# Patient Record
Sex: MALE | Race: WHITE | NOT HISPANIC OR LATINO | Employment: UNEMPLOYED | URBAN - METROPOLITAN AREA
[De-identification: names, ages, dates, MRNs, and addresses within clinical notes are randomized per-mention and may not be internally consistent; named-entity substitution may affect disease eponyms.]

---

## 2023-08-16 ENCOUNTER — TRANSFERRED RECORDS (OUTPATIENT)
Dept: HEALTH INFORMATION MANAGEMENT | Facility: CLINIC | Age: 58
End: 2023-08-16
Payer: MEDICAID

## 2023-08-16 ENCOUNTER — HOSPITAL ENCOUNTER (INPATIENT)
Facility: CLINIC | Age: 58
LOS: 3 days | Discharge: HOME HEALTH | End: 2023-08-19
Attending: STUDENT IN AN ORGANIZED HEALTH CARE EDUCATION/TRAINING PROGRAM | Admitting: STUDENT IN AN ORGANIZED HEALTH CARE EDUCATION/TRAINING PROGRAM
Payer: MEDICAID

## 2023-08-16 DIAGNOSIS — K21.00 GASTROESOPHAGEAL REFLUX DISEASE WITH ESOPHAGITIS, UNSPECIFIED WHETHER HEMORRHAGE: ICD-10-CM

## 2023-08-16 DIAGNOSIS — M25.50 POLYARTHRALGIA: Primary | ICD-10-CM

## 2023-08-16 LAB
ALT SERPL-CCNC: 8 U/L (ref 7–52)
AST SERPL-CCNC: 9 U/L (ref 13–39)
BASOPHILS # BLD AUTO: 0 10E3/UL (ref 0–0.2)
BASOPHILS NFR BLD AUTO: 0 %
CREATININE (EXTERNAL): 0.3 MG/DL (ref 0.7–1.3)
EOSINOPHIL # BLD AUTO: 0 10E3/UL (ref 0–0.7)
EOSINOPHIL NFR BLD AUTO: 0 %
ERYTHROCYTE [DISTWIDTH] IN BLOOD BY AUTOMATED COUNT: 13.2 % (ref 10–15)
GFR ESTIMATED (EXTERNAL): 308 (ref 60–90)
GLUCOSE (EXTERNAL): 155 MG/DL (ref 74–109)
HCT VFR BLD AUTO: 33 % (ref 40–53)
HGB BLD-MCNC: 11.5 G/DL (ref 13.3–17.7)
IMM GRANULOCYTES # BLD: 0.3 10E3/UL
IMM GRANULOCYTES NFR BLD: 1 %
LYMPHOCYTES # BLD AUTO: 1.8 10E3/UL (ref 0.8–5.3)
LYMPHOCYTES NFR BLD AUTO: 6 %
MCH RBC QN AUTO: 33.9 PG (ref 26.5–33)
MCHC RBC AUTO-ENTMCNC: 34.8 G/DL (ref 31.5–36.5)
MCV RBC AUTO: 97 FL (ref 78–100)
MONOCYTES # BLD AUTO: 1.8 10E3/UL (ref 0–1.3)
MONOCYTES NFR BLD AUTO: 6 %
NEUTROPHILS # BLD AUTO: 24.7 10E3/UL (ref 1.6–8.3)
NEUTROPHILS NFR BLD AUTO: 87 %
NRBC # BLD AUTO: 0 10E3/UL
NRBC BLD AUTO-RTO: 0 /100
PLATELET # BLD AUTO: 451 10E3/UL (ref 150–450)
POTASSIUM (EXTERNAL): 4.1 MMOL/L (ref 3.5–5.1)
RBC # BLD AUTO: 3.39 10E6/UL (ref 4.4–5.9)
WBC # BLD AUTO: 28.6 10E3/UL (ref 4–11)

## 2023-08-16 PROCEDURE — 87040 BLOOD CULTURE FOR BACTERIA: CPT | Performed by: STUDENT IN AN ORGANIZED HEALTH CARE EDUCATION/TRAINING PROGRAM

## 2023-08-16 PROCEDURE — 120N000002 HC R&B MED SURG/OB UMMC

## 2023-08-16 PROCEDURE — 99223 1ST HOSP IP/OBS HIGH 75: CPT | Performed by: STUDENT IN AN ORGANIZED HEALTH CARE EDUCATION/TRAINING PROGRAM

## 2023-08-16 PROCEDURE — 86704 HEP B CORE ANTIBODY TOTAL: CPT | Performed by: STUDENT IN AN ORGANIZED HEALTH CARE EDUCATION/TRAINING PROGRAM

## 2023-08-16 PROCEDURE — 85652 RBC SED RATE AUTOMATED: CPT | Performed by: STUDENT IN AN ORGANIZED HEALTH CARE EDUCATION/TRAINING PROGRAM

## 2023-08-16 PROCEDURE — 86140 C-REACTIVE PROTEIN: CPT | Performed by: STUDENT IN AN ORGANIZED HEALTH CARE EDUCATION/TRAINING PROGRAM

## 2023-08-16 PROCEDURE — 36415 COLL VENOUS BLD VENIPUNCTURE: CPT | Performed by: STUDENT IN AN ORGANIZED HEALTH CARE EDUCATION/TRAINING PROGRAM

## 2023-08-16 PROCEDURE — 93005 ELECTROCARDIOGRAM TRACING: CPT

## 2023-08-16 PROCEDURE — 85025 COMPLETE CBC W/AUTO DIFF WBC: CPT | Performed by: STUDENT IN AN ORGANIZED HEALTH CARE EDUCATION/TRAINING PROGRAM

## 2023-08-16 PROCEDURE — 80053 COMPREHEN METABOLIC PANEL: CPT | Performed by: STUDENT IN AN ORGANIZED HEALTH CARE EDUCATION/TRAINING PROGRAM

## 2023-08-16 PROCEDURE — 86803 HEPATITIS C AB TEST: CPT | Performed by: STUDENT IN AN ORGANIZED HEALTH CARE EDUCATION/TRAINING PROGRAM

## 2023-08-16 PROCEDURE — 93010 ELECTROCARDIOGRAM REPORT: CPT | Performed by: INTERNAL MEDICINE

## 2023-08-16 RX ORDER — OXYCODONE HYDROCHLORIDE 5 MG/1
5 TABLET ORAL EVERY 4 HOURS PRN
Status: DISCONTINUED | OUTPATIENT
Start: 2023-08-16 | End: 2023-08-19 | Stop reason: HOSPADM

## 2023-08-16 RX ORDER — ACETAMINOPHEN 325 MG/1
650 TABLET ORAL EVERY 4 HOURS PRN
Status: DISCONTINUED | OUTPATIENT
Start: 2023-08-16 | End: 2023-08-19 | Stop reason: HOSPADM

## 2023-08-16 RX ORDER — ONDANSETRON 4 MG/1
4 TABLET, ORALLY DISINTEGRATING ORAL EVERY 6 HOURS PRN
Status: DISCONTINUED | OUTPATIENT
Start: 2023-08-16 | End: 2023-08-19 | Stop reason: HOSPADM

## 2023-08-16 RX ORDER — SODIUM CHLORIDE 9 MG/ML
INJECTION, SOLUTION INTRAVENOUS CONTINUOUS
Status: ACTIVE | OUTPATIENT
Start: 2023-08-17 | End: 2023-08-17

## 2023-08-16 RX ORDER — POLYETHYLENE GLYCOL 3350 17 G/17G
17 POWDER, FOR SOLUTION ORAL DAILY
Status: DISCONTINUED | OUTPATIENT
Start: 2023-08-17 | End: 2023-08-19 | Stop reason: HOSPADM

## 2023-08-16 RX ORDER — LIDOCAINE 40 MG/G
CREAM TOPICAL
Status: DISCONTINUED | OUTPATIENT
Start: 2023-08-16 | End: 2023-08-19 | Stop reason: HOSPADM

## 2023-08-16 RX ORDER — HYDROMORPHONE HYDROCHLORIDE 1 MG/ML
0.3 INJECTION, SOLUTION INTRAMUSCULAR; INTRAVENOUS; SUBCUTANEOUS
Status: DISCONTINUED | OUTPATIENT
Start: 2023-08-16 | End: 2023-08-19 | Stop reason: HOSPADM

## 2023-08-16 RX ORDER — NALOXONE HYDROCHLORIDE 0.4 MG/ML
0.4 INJECTION, SOLUTION INTRAMUSCULAR; INTRAVENOUS; SUBCUTANEOUS
Status: DISCONTINUED | OUTPATIENT
Start: 2023-08-16 | End: 2023-08-19 | Stop reason: HOSPADM

## 2023-08-16 RX ORDER — NALOXONE HYDROCHLORIDE 0.4 MG/ML
0.2 INJECTION, SOLUTION INTRAMUSCULAR; INTRAVENOUS; SUBCUTANEOUS
Status: DISCONTINUED | OUTPATIENT
Start: 2023-08-16 | End: 2023-08-19 | Stop reason: HOSPADM

## 2023-08-16 RX ORDER — ONDANSETRON 2 MG/ML
4 INJECTION INTRAMUSCULAR; INTRAVENOUS EVERY 6 HOURS PRN
Status: DISCONTINUED | OUTPATIENT
Start: 2023-08-16 | End: 2023-08-19 | Stop reason: HOSPADM

## 2023-08-16 RX ORDER — PIPERACILLIN SODIUM, TAZOBACTAM SODIUM 3; .375 G/15ML; G/15ML
3.38 INJECTION, POWDER, LYOPHILIZED, FOR SOLUTION INTRAVENOUS EVERY 6 HOURS
Status: DISCONTINUED | OUTPATIENT
Start: 2023-08-17 | End: 2023-08-18

## 2023-08-16 ASSESSMENT — COLUMBIA-SUICIDE SEVERITY RATING SCALE - C-SSRS
1. IN THE PAST MONTH, HAVE YOU WISHED YOU WERE DEAD OR WISHED YOU COULD GO TO SLEEP AND NOT WAKE UP?: NO
6. HAVE YOU EVER DONE ANYTHING, STARTED TO DO ANYTHING, OR PREPARED TO DO ANYTHING TO END YOUR LIFE?: NO
3. HAVE YOU BEEN THINKING ABOUT HOW YOU MIGHT KILL YOURSELF?: NO
5. HAVE YOU STARTED TO WORK OUT OR WORKED OUT THE DETAILS OF HOW TO KILL YOURSELF? DO YOU INTEND TO CARRY OUT THIS PLAN?: NO
2. HAVE YOU ACTUALLY HAD ANY THOUGHTS OF KILLING YOURSELF IN THE PAST MONTH?: NO
4. HAVE YOU HAD THESE THOUGHTS AND HAD SOME INTENTION OF ACTING ON THEM?: NO

## 2023-08-16 ASSESSMENT — ACTIVITIES OF DAILY LIVING (ADL): ADLS_ACUITY_SCORE: 33

## 2023-08-16 NOTE — PROGRESS NOTES
Transfer Type: [facility]  Transfer Triage Note    Date of call: 08/16/23  Time of call: 1:46 PM    Current Patient Location:  [place]  Current Level of Care: ED    Vitals:                      at    Diagnosis: Sepsis and swollen joints  Is COVID-19 a concern? No  Reason for requested transfer: Further diagnostic work up, management, and consultation for specialized care   Isolation Needs: None    Outside Records: Not available  Additional records may be faxed to [phone].    Transfer accepted: Yes  Stability of Patient: Patient is vitally stable, with no critical labs, and will likely remain stable throughout the transfer process  Level of Care Needed: Med Surg  Telemetry Needed:  Med (Remote) Telemetry  Expected Time of Arrival for Transfer: 0-8 hours  Arrival Location:  [facility]    Recommendations for Management and Stabilization: Given    Additional Comments: 58 year old man with leg pain. Seen at [facility] and treated for cellulitis (per NP was not thrombus or septic joint) about 1 week ago. Returns with R leg pain (knee). WBC with increase to 28k from 20k. Slight monocyte elevation. No fever. Negative lyme. Lactate at 2.4 after fluids. CRP and ESR elevated. Would like to transfer for further workup and concern for sepsis vs rheum etiology. Consideration for rheumatology consult on admission. Will put on wait list for  bed.    Nick Romero MD

## 2023-08-16 NOTE — LETTER
Recipient:  [facility]  Fax: [phone]        Sender:  ENDER Owen Care Coordinator  Ph: [phone]        Date: August 19, 2023  Patient Name:  Elijah Beach  Patient YOB: 1965  Routing Message:    I reached out today (8/19/23) to follow-up on a home care referral that was already made for this patient, but was informed there is nobody available to discuss the referral over the weekend.  I was given this fax number to send information to.  Attached are signed physician orders, in the event you are able to accept this patient for home PT services.  Thank you for your consideration.      The documents accompanying this notice contain confidential information belonging to the sender.  This information is intended only for the use of the individual or entity named above.  The authorized recipient of this information is prohibited from disclosing this information to any other party and is required to destroy the information after its stated need has been fulfilled, unless otherwise required by state law.    If you are not the intended recipient, you are hereby notified that any disclosure, copy, distribution or action taken in reliance on the contents of these documents is strictly prohibited.  If you have received this document in error, please return it by fax to [phone] with a note on the cover sheet explaining why you are returning it (e.g. not your patient, not your provider, etc.).  If you need further assistance, please call .  Documents may also be returned by mail to Health Information Management, , [facility] [address], [place], [place] [address].

## 2023-08-16 NOTE — LETTER
Transition Communication Hand-off for Care Transitions to Next Level of Care Provider    Name: Elijah Beach  : 1965  MRN #: [id number]  Primary Care Provider: Benedict Márquez     Primary Clinic: [facility]  [address]    [place] [address]     Reason for Hospitalization:  Polyarthralgia [M25.50]  Admit Date/Time: 2023  9:48 PM  Discharge Date: 23  Payor Source: Payor: MEDICAID [place] / Plan: [employer] [place] / Product Type: *No Product type* /          Reason for Communication Hand-off Referral: Avoidable readmission within 30 days    Discharge Plan: Discharged to home w/ outpatient PT referral, home PT recommended (see bottom of this handoff for further details).    Discharge Plan:      Flowsheet Row Most Recent Value   Concerns Comments ability to find home PT in his area, lives 3 miles out of town             Discharge Needs Assessment:  Needs      Flowsheet Row Most Recent Value   Equipment Currently Used at Home walker, rolling          Follow-up plan:  No future appointments.    Any outstanding tests or procedures:        Referrals       Future Labs/Procedures    Physical Therapy Referral     Process Instructions:    Work Related Injury: Functional Capacity and Work Conditioning are only offered at [facility] and [facility] (service can be provided by PT or OT).    *This therapy referral will be filtered to a centralized scheduling office at [facility] and the patient will receive a call to schedule an appointment at a [facility] location most convenient for them. *    Comments:    Please be aware that coverage of these services is subject to the terms and limitations of your health insurance plan.  Call member services at your health plan with any benefit or coverage questions.  Please call to schedule your appointment      Home Care Referral     Comments:    Your provider has ordered home health services. If you have not been contacted within 2 days  of your discharge please call the selected Home Care agency listed on your Discharge document.  If a Home Care agency is NOT listed, please call [phone].              Key Recommendations:  Patient was recommended home care PT, however agencies that service his area are limited and the remaining pending home care referral was unable to be followed up on due to no staffing over the weekend at home care agency.  Sent orders to home care agency in the event they are able to accept patient for services, as well as placed outpatient PT referral.  Recommend PCP follow-up appt for this patient and follow-up with pending home care agency ([facility]: [phone] ext [phone]).    Daniel Mancia RN Care Coordinator    AVS/Discharge Summary is the source of truth; this is a helpful guide for improved communication of patient story

## 2023-08-17 ENCOUNTER — APPOINTMENT (OUTPATIENT)
Dept: GENERAL RADIOLOGY | Facility: CLINIC | Age: 58
End: 2023-08-17
Payer: MEDICAID

## 2023-08-17 ENCOUNTER — APPOINTMENT (OUTPATIENT)
Dept: GENERAL RADIOLOGY | Facility: CLINIC | Age: 58
End: 2023-08-17
Attending: INTERNAL MEDICINE
Payer: MEDICAID

## 2023-08-17 ENCOUNTER — APPOINTMENT (OUTPATIENT)
Dept: PHYSICAL THERAPY | Facility: CLINIC | Age: 58
End: 2023-08-17
Attending: STUDENT IN AN ORGANIZED HEALTH CARE EDUCATION/TRAINING PROGRAM
Payer: MEDICAID

## 2023-08-17 LAB
ALBUMIN SERPL BCG-MCNC: 2.9 G/DL (ref 3.5–5.2)
ALBUMIN SERPL BCG-MCNC: 3.3 G/DL (ref 3.5–5.2)
ALP SERPL-CCNC: 60 U/L (ref 40–129)
ALP SERPL-CCNC: 67 U/L (ref 40–129)
ALT SERPL W P-5'-P-CCNC: 9 U/L (ref 0–70)
ALT SERPL W P-5'-P-CCNC: 9 U/L (ref 0–70)
ANION GAP SERPL CALCULATED.3IONS-SCNC: 10 MMOL/L (ref 7–15)
ANION GAP SERPL CALCULATED.3IONS-SCNC: 10 MMOL/L (ref 7–15)
APPEARANCE FLD: ABNORMAL
AST SERPL W P-5'-P-CCNC: 12 U/L (ref 0–45)
AST SERPL W P-5'-P-CCNC: 14 U/L (ref 0–45)
BILIRUB SERPL-MCNC: 0.3 MG/DL
BILIRUB SERPL-MCNC: 0.3 MG/DL
BUN SERPL-MCNC: 10.6 MG/DL (ref 6–20)
BUN SERPL-MCNC: 7.2 MG/DL (ref 6–20)
CALCIUM SERPL-MCNC: 9.3 MG/DL (ref 8.6–10)
CALCIUM SERPL-MCNC: 9.4 MG/DL (ref 8.6–10)
CELL COUNT BODY FLUID SOURCE: ABNORMAL
CHLORIDE SERPL-SCNC: 101 MMOL/L (ref 98–107)
CHLORIDE SERPL-SCNC: 95 MMOL/L (ref 98–107)
COLOR FLD: ABNORMAL
CREAT SERPL-MCNC: 0.42 MG/DL (ref 0.67–1.17)
CREAT SERPL-MCNC: 0.45 MG/DL (ref 0.67–1.17)
CRP SERPL-MCNC: 129.35 MG/L
CRP SERPL-MCNC: 90.76 MG/L
CRYSTALS SNV MICRO: NORMAL
DEPRECATED HCO3 PLAS-SCNC: 24 MMOL/L (ref 22–29)
DEPRECATED HCO3 PLAS-SCNC: 26 MMOL/L (ref 22–29)
ERYTHROCYTE [DISTWIDTH] IN BLOOD BY AUTOMATED COUNT: 13.2 % (ref 10–15)
ERYTHROCYTE [DISTWIDTH] IN BLOOD BY AUTOMATED COUNT: 13.3 % (ref 10–15)
ERYTHROCYTE [SEDIMENTATION RATE] IN BLOOD BY WESTERGREN METHOD: 38 MM/HR (ref 0–20)
ERYTHROCYTE [SEDIMENTATION RATE] IN BLOOD BY WESTERGREN METHOD: 47 MM/HR (ref 0–20)
GFR SERPL CREATININE-BSD FRML MDRD: >90 ML/MIN/1.73M2
GFR SERPL CREATININE-BSD FRML MDRD: >90 ML/MIN/1.73M2
GLUCOSE SERPL-MCNC: 126 MG/DL (ref 70–99)
GLUCOSE SERPL-MCNC: 135 MG/DL (ref 70–99)
GRAM STAIN RESULT: NORMAL
GRAM STAIN RESULT: NORMAL
HBV CORE AB SERPL QL IA: NONREACTIVE
HBV SURFACE AB SERPL IA-ACNC: 0.16 M[IU]/ML
HBV SURFACE AB SERPL IA-ACNC: NONREACTIVE M[IU]/ML
HCT VFR BLD AUTO: 30.5 % (ref 40–53)
HCT VFR BLD AUTO: 31 % (ref 40–53)
HCV AB SERPL QL IA: NONREACTIVE
HGB BLD-MCNC: 10.6 G/DL (ref 13.3–17.7)
HGB BLD-MCNC: 10.8 G/DL (ref 13.3–17.7)
HOLD SPECIMEN: NORMAL
LYMPHOCYTES NFR FLD MANUAL: NORMAL %
MCH RBC QN AUTO: 33.5 PG (ref 26.5–33)
MCH RBC QN AUTO: 34.3 PG (ref 26.5–33)
MCHC RBC AUTO-ENTMCNC: 34.2 G/DL (ref 31.5–36.5)
MCHC RBC AUTO-ENTMCNC: 35.4 G/DL (ref 31.5–36.5)
MCV RBC AUTO: 97 FL (ref 78–100)
MCV RBC AUTO: 98 FL (ref 78–100)
MONOS+MACROS NFR FLD MANUAL: 3 %
NEUTS BAND NFR FLD MANUAL: 97 %
PLATELET # BLD AUTO: 423 10E3/UL (ref 150–450)
PLATELET # BLD AUTO: 446 10E3/UL (ref 150–450)
POTASSIUM SERPL-SCNC: 3.8 MMOL/L (ref 3.4–5.3)
POTASSIUM SERPL-SCNC: 4.3 MMOL/L (ref 3.4–5.3)
PROT SERPL-MCNC: 5.4 G/DL (ref 6.4–8.3)
PROT SERPL-MCNC: 6 G/DL (ref 6.4–8.3)
RBC # BLD AUTO: 3.15 10E6/UL (ref 4.4–5.9)
RBC # BLD AUTO: 3.16 10E6/UL (ref 4.4–5.9)
SODIUM SERPL-SCNC: 129 MMOL/L (ref 136–145)
SODIUM SERPL-SCNC: 137 MMOL/L (ref 136–145)
URATE SERPL-MCNC: 2.3 MG/DL (ref 3.4–7)
WBC # BLD AUTO: 23.1 10E3/UL (ref 4–11)
WBC # BLD AUTO: 24.7 10E3/UL (ref 4–11)
WBC # FLD AUTO: ABNORMAL /UL

## 2023-08-17 PROCEDURE — 89060 EXAM SYNOVIAL FLUID CRYSTALS: CPT

## 2023-08-17 PROCEDURE — 89050 BODY FLUID CELL COUNT: CPT

## 2023-08-17 PROCEDURE — 87205 SMEAR GRAM STAIN: CPT

## 2023-08-17 PROCEDURE — 97116 GAIT TRAINING THERAPY: CPT | Mod: GP

## 2023-08-17 PROCEDURE — 120N000002 HC R&B MED SURG/OB UMMC

## 2023-08-17 PROCEDURE — 87102 FUNGUS ISOLATION CULTURE: CPT

## 2023-08-17 PROCEDURE — 97530 THERAPEUTIC ACTIVITIES: CPT | Mod: GP

## 2023-08-17 PROCEDURE — 73522 X-RAY EXAM HIPS BI 3-4 VIEWS: CPT

## 2023-08-17 PROCEDURE — 89051 BODY FLUID CELL COUNT: CPT

## 2023-08-17 PROCEDURE — 85027 COMPLETE CBC AUTOMATED: CPT | Performed by: STUDENT IN AN ORGANIZED HEALTH CARE EDUCATION/TRAINING PROGRAM

## 2023-08-17 PROCEDURE — 250N000013 HC RX MED GY IP 250 OP 250 PS 637: Performed by: INTERNAL MEDICINE

## 2023-08-17 PROCEDURE — 87075 CULTR BACTERIA EXCEPT BLOOD: CPT

## 2023-08-17 PROCEDURE — 85027 COMPLETE CBC AUTOMATED: CPT

## 2023-08-17 PROCEDURE — 87389 HIV-1 AG W/HIV-1&-2 AB AG IA: CPT | Performed by: INTERNAL MEDICINE

## 2023-08-17 PROCEDURE — 84550 ASSAY OF BLOOD/URIC ACID: CPT

## 2023-08-17 PROCEDURE — 86140 C-REACTIVE PROTEIN: CPT

## 2023-08-17 PROCEDURE — 0S9C3ZX DRAINAGE OF RIGHT KNEE JOINT, PERCUTANEOUS APPROACH, DIAGNOSTIC: ICD-10-PCS | Performed by: ORTHOPAEDIC SURGERY

## 2023-08-17 PROCEDURE — 87206 SMEAR FLUORESCENT/ACID STAI: CPT

## 2023-08-17 PROCEDURE — 250N000013 HC RX MED GY IP 250 OP 250 PS 637: Performed by: STUDENT IN AN ORGANIZED HEALTH CARE EDUCATION/TRAINING PROGRAM

## 2023-08-17 PROCEDURE — 85652 RBC SED RATE AUTOMATED: CPT

## 2023-08-17 PROCEDURE — 87491 CHLMYD TRACH DNA AMP PROBE: CPT | Performed by: INTERNAL MEDICINE

## 2023-08-17 PROCEDURE — 258N000003 HC RX IP 258 OP 636: Performed by: STUDENT IN AN ORGANIZED HEALTH CARE EDUCATION/TRAINING PROGRAM

## 2023-08-17 PROCEDURE — 87070 CULTURE OTHR SPECIMN AEROBIC: CPT

## 2023-08-17 PROCEDURE — 99233 SBSQ HOSP IP/OBS HIGH 50: CPT | Performed by: INTERNAL MEDICINE

## 2023-08-17 PROCEDURE — 36415 COLL VENOUS BLD VENIPUNCTURE: CPT | Performed by: INTERNAL MEDICINE

## 2023-08-17 PROCEDURE — 250N000011 HC RX IP 250 OP 636: Mod: JZ | Performed by: STUDENT IN AN ORGANIZED HEALTH CARE EDUCATION/TRAINING PROGRAM

## 2023-08-17 PROCEDURE — 36415 COLL VENOUS BLD VENIPUNCTURE: CPT | Performed by: STUDENT IN AN ORGANIZED HEALTH CARE EDUCATION/TRAINING PROGRAM

## 2023-08-17 PROCEDURE — 73562 X-RAY EXAM OF KNEE 3: CPT | Mod: LT

## 2023-08-17 PROCEDURE — 87476 LYME DIS DNA AMP PROBE: CPT | Performed by: INTERNAL MEDICINE

## 2023-08-17 PROCEDURE — 97161 PT EVAL LOW COMPLEX 20 MIN: CPT | Mod: GP

## 2023-08-17 PROCEDURE — 99255 IP/OBS CONSLTJ NEW/EST HI 80: CPT | Mod: GC | Performed by: INTERNAL MEDICINE

## 2023-08-17 PROCEDURE — 36415 COLL VENOUS BLD VENIPUNCTURE: CPT

## 2023-08-17 PROCEDURE — 80053 COMPREHEN METABOLIC PANEL: CPT | Performed by: STUDENT IN AN ORGANIZED HEALTH CARE EDUCATION/TRAINING PROGRAM

## 2023-08-17 PROCEDURE — 87116 MYCOBACTERIA CULTURE: CPT

## 2023-08-17 RX ORDER — ACETAMINOPHEN 500 MG
1000 TABLET ORAL 3 TIMES DAILY
COMMUNITY

## 2023-08-17 RX ORDER — CITALOPRAM HYDROBROMIDE 10 MG/1
30 TABLET ORAL DAILY
Status: DISCONTINUED | OUTPATIENT
Start: 2023-08-17 | End: 2023-08-17

## 2023-08-17 RX ORDER — TETRAHYDROZOLINE HCL 0.05 %
1 DROPS OPHTHALMIC (EYE) 4 TIMES DAILY PRN
Status: DISCONTINUED | OUTPATIENT
Start: 2023-08-17 | End: 2023-08-19 | Stop reason: HOSPADM

## 2023-08-17 RX ORDER — IBUPROFEN 400 MG/1
400 TABLET, FILM COATED ORAL 3 TIMES DAILY
Status: ON HOLD | COMMUNITY
End: 2023-08-19

## 2023-08-17 RX ORDER — TETRAHYDROZOLINE HCL 0.05 %
1 DROPS OPHTHALMIC (EYE) PRN
COMMUNITY

## 2023-08-17 RX ORDER — OXYCODONE HYDROCHLORIDE 5 MG/1
2.5-5 TABLET ORAL EVERY 6 HOURS PRN
COMMUNITY

## 2023-08-17 RX ORDER — ACETAMINOPHEN 500 MG
1000 TABLET ORAL 3 TIMES DAILY
Status: DISCONTINUED | OUTPATIENT
Start: 2023-08-17 | End: 2023-08-19 | Stop reason: HOSPADM

## 2023-08-17 RX ORDER — CITALOPRAM HYDROBROMIDE 10 MG/1
30 TABLET ORAL DAILY
Status: DISCONTINUED | OUTPATIENT
Start: 2023-08-17 | End: 2023-08-19 | Stop reason: HOSPADM

## 2023-08-17 RX ORDER — CITALOPRAM HYDROBROMIDE 20 MG/1
30 TABLET ORAL DAILY
COMMUNITY

## 2023-08-17 RX ORDER — IBUPROFEN 200 MG
400 TABLET ORAL 3 TIMES DAILY
Status: DISCONTINUED | OUTPATIENT
Start: 2023-08-17 | End: 2023-08-18 | Stop reason: ALTCHOICE

## 2023-08-17 RX ADMIN — OXYCODONE HYDROCHLORIDE 5 MG: 5 TABLET ORAL at 17:07

## 2023-08-17 RX ADMIN — VANCOMYCIN HYDROCHLORIDE 1250 MG: 10 INJECTION, POWDER, LYOPHILIZED, FOR SOLUTION INTRAVENOUS at 02:40

## 2023-08-17 RX ADMIN — POLYETHYLENE GLYCOL 3350 17 G: 17 POWDER, FOR SOLUTION ORAL at 08:23

## 2023-08-17 RX ADMIN — IBUPROFEN 400 MG: 200 TABLET, FILM COATED ORAL at 13:38

## 2023-08-17 RX ADMIN — PIPERACILLIN AND TAZOBACTAM 3.38 G: 3; .375 INJECTION, POWDER, LYOPHILIZED, FOR SOLUTION INTRAVENOUS at 01:38

## 2023-08-17 RX ADMIN — ACETAMINOPHEN 1000 MG: 500 TABLET, FILM COATED ORAL at 19:52

## 2023-08-17 RX ADMIN — PIPERACILLIN AND TAZOBACTAM 3.38 G: 3; .375 INJECTION, POWDER, LYOPHILIZED, FOR SOLUTION INTRAVENOUS at 19:54

## 2023-08-17 RX ADMIN — PIPERACILLIN AND TAZOBACTAM 3.38 G: 3; .375 INJECTION, POWDER, LYOPHILIZED, FOR SOLUTION INTRAVENOUS at 11:50

## 2023-08-17 RX ADMIN — CITALOPRAM HYDROBROMIDE 30 MG: 10 TABLET ORAL at 08:23

## 2023-08-17 RX ADMIN — VANCOMYCIN HYDROCHLORIDE 1250 MG: 10 INJECTION, POWDER, LYOPHILIZED, FOR SOLUTION INTRAVENOUS at 13:27

## 2023-08-17 RX ADMIN — IBUPROFEN 400 MG: 200 TABLET, FILM COATED ORAL at 19:52

## 2023-08-17 RX ADMIN — SODIUM CHLORIDE: 9 INJECTION, SOLUTION INTRAVENOUS at 01:38

## 2023-08-17 RX ADMIN — ACETAMINOPHEN 1000 MG: 500 TABLET, FILM COATED ORAL at 13:38

## 2023-08-17 RX ADMIN — PIPERACILLIN AND TAZOBACTAM 3.38 G: 3; .375 INJECTION, POWDER, LYOPHILIZED, FOR SOLUTION INTRAVENOUS at 06:17

## 2023-08-17 ASSESSMENT — ACTIVITIES OF DAILY LIVING (ADL)
CONCENTRATING,_REMEMBERING_OR_MAKING_DECISIONS_DIFFICULTY: NO
ADLS_ACUITY_SCORE: 28
TOILETING: 1-->ASSISTANCE (EQUIPMENT/PERSON) NEEDED
WEAR_GLASSES_OR_BLIND: YES
HEARING_DIFFICULTY_OR_DEAF: NO
ADLS_ACUITY_SCORE: 28
WALKING_OR_CLIMBING_STAIRS: AMBULATION DIFFICULTY, ASSISTANCE 1 PERSON
ADLS_ACUITY_SCORE: 28
ADLS_ACUITY_SCORE: 28
EQUIPMENT_CURRENTLY_USED_AT_HOME: WALKER, STANDARD
TOILETING: 1-->ASSISTANCE (EQUIPMENT/PERSON) NEEDED (NOT DEVELOPMENTALLY APPROPRIATE)
ADLS_ACUITY_SCORE: 28
TRANSFERRING: 1-->ASSISTANCE (EQUIPMENT/PERSON) NEEDED
ADLS_ACUITY_SCORE: 28
TOILETING_ISSUES: YES
CHANGE_IN_FUNCTIONAL_STATUS_SINCE_ONSET_OF_CURRENT_ILLNESS/INJURY: YES
DIFFICULTY_EATING/SWALLOWING: NO
DOING_ERRANDS_INDEPENDENTLY_DIFFICULTY: NO
ADLS_ACUITY_SCORE: 28
FALL_HISTORY_WITHIN_LAST_SIX_MONTHS: NO
DIFFICULTY_COMMUNICATING: NO
WALKING_OR_CLIMBING_STAIRS_DIFFICULTY: NO
DRESSING/BATHING_DIFFICULTY: NO
ADLS_ACUITY_SCORE: 28
TRANSFERRING: 1-->ASSISTANCE (EQUIPMENT/PERSON) NEEDED (NOT DEVELOPMENTALLY APPROPRIATE)
TOILETING_ASSISTANCE: TOILETING DIFFICULTY, ASSISTANCE 1 PERSON

## 2023-08-17 NOTE — CONSULTS
Rheumatology Consult Note-Fellow    Elijah Beach MRN# [id number]   Age: 58 year old YOB: 1965     Date of Admission: 8/16/2023    Reason for consult: Elevated inflammatory markers, polyarthralgia.    Requesting Physician: Dr. Pino      Assessment & Plan:     ASSESSMENT:  Elijah Beach is a 58 year old with PMH significant for chronic back pain, tobacco use, alcohol use and peripheral neuropathy bilateral hospital today with complaints of polyarthralgia.  Differential for migrating polyarthralgias includes rheumatologic conditions like lupus, seronegative spondylarthritis, rheumatoid arthritis, adult onset stills disease, vasculitis.  Other conditions in the differential include infective endocarditis, chlamydia, gonorrhea infection, Lyme disease, HIV, hep B, hep C, crystalline arthritis and rheumatic fever.  His long duration of symptoms makes septic arthritis unlikely.  MELISSA, RF, CCP negative making lupus and rheumatoid arthritis unlikely.  Lyme disease panel antigen testing negative in the past.  Hep B, hep C testing negative.  Prior arthrocentesis did not reveal any crystalline disease and cell count is inflammatory with 14.8 K WBC and 96% neutrophils.  Repeat joint tap done and results still pending.  Reviewed x-rays of knee, no obvious chondrocalcinosis.  Uric acid 2.3 making gout less likely.  Patient also reports only minimal morning stiffness and reports that symptoms usually last for about a week to 2 weeks when they happen.  Malignancy is also in the differential.    Labs: CRP 90.76, ESR 47, uric acid 2.3,  DIAGNOSIS:  1.  Migratory polyarthralgia    PLAN:  -- We will order repeat Lyme testing with PCR and gonococcal, chlamydia testing.  We will order HIV testing.  -- We will await arthrocentesis results.  -- If the extensive work-up is all negative, we will have to treat the patient as a seronegative spondylarthritis with predominantly peripheral symptoms.  Patients can develop  "psoriatic arthritis several years preceding rash.  We will order bilateral sacroiliac joint x-ray for further evaluation.  -- Rheumatology will continue to follow.    I discussed the findings and recommendations with the patient.  I communicated the assessment and plan to the consulting team.    Case seen and discussed with Dr. Dodson who agrees with above assessment and plan.     Thank you Fransico Pino MD for for this interesting consult. Please contact us if there are any questions.     Nabeel Araujo,  Rheumatology Fellow.    \"This note was generated using dragon software and reviewed by myself.  Please excuse any grammatical or spelling errors above\".     Staff addendum  I performed the history and physical examination of the patient and discussed the management with the fellow. I reviewed the available lab and imaging studies. I reviewed the fellow's note and agree with the documented findings and plan of care.    Giovanni Dodson MD  Rheumatology      HPI     Elijah Beach is a 58 year old with PMH significant for chronic back pain, tobacco use, alcohol use and peripheral neuropathy bilateral hospital today with complaints of polyarthralgia.  Patient was initially seen in outpatient ER on 8/4 with complaints of right hand swelling and progression to right elbow and right shoulder swelling.  Demonstrated a CT angio as there was concern for SVC syndrome and restarted on AC.  Vascular consulted and ultrasound did not reveal any evidence of DVT and anticoagulation discontinued.  Patient had persistent elevated ESR and CRP since admission with significant leukocytosis and was started on antibiotics.  Ortho consulted, joint aspiration of fluid done with negative crystals and negative cultures. TTE neg for endocarditis. MELISSA, CCP, Anti DNA, Lyme, Chlamydia/Gonorrhea all neg.  CT C/A/P was done then and showed thickened distal esophagus with small paraesophageal lymph node. EGD done 08/07/23 showed " "potential spasm. Started on SL Nitroglycerin. Pt was discharged with PPI and Nitroglycerin and follow up with Rheumatology if occurred again.   He presented again 08/16 due to Rt foot pain and swelling with swelling of the Rt knee. Ultrasound neg for DVT. Was treated with IV Vanc/Zosyn with fluids and pain control and got a dose of methylprednisolone. Sent here for further evaluation with need for Rheumatology consult.     Patient denies any fevers, chills, weight loss, vision changes, headaches, focal weakness or numbness.  Denies any arthralgias, morning stiffness, Raynaud's, myalgias,  Alopecia, rash, vitiligo, photosensitivity, nasal or oral ulcers, ear fullness or drainage.   No cough or dyspnea, no hematuria, no history of blood clots.       HISTORY REVIEW:  Active smoking, alcohol use but no recreational drug use.    No family history of autoimmune conditions.    Patient Active Problem List   Diagnosis    Polyarthralgia     Allergies   Allergen Reactions    Bees Anaphylaxis    Clindamycin Itching         ROS     A 14 point ROS was performed with pertinent findings listed above.    I have reviewed the patients past medical history, past surgical history, current medications, current allergies, family history and social history.       Objective     PHYSICAL EXAM  /74 (BP Location: Left arm)   Pulse 74   Temp 98.9  F (37.2  C) (Oral)   Resp 15   Ht 1.767 m (5' 9.57\")   Wt 58.6 kg (129 lb 3 oz)   SpO2 97%   BMI 18.77 kg/m    Wt Readings from Last 4 Encounters:   08/16/23 58.6 kg (129 lb 3 oz)     Constitutional: WD-WN-WG cooperative  Eyes: nl EOM, PERRLA, vision, conjunctiva, sclera  ENT: nl external ears, nose, hearing, lips, teeth, gums, throat  No mucous membrane lesions, normal saliva pool  Neck: no mass or thyroid enlargement  Resp: lungs clear to auscultation, nl to palpation  CV: RRR, no murmurs, rubs or gallops, no edema  GI: no ABD mass or tenderness, no HSM  : not tested  Lymph: no " cervical, supraclavicular, inguinal or epitrochlear nodes  MS: S0 T0 in bilateral PIP, DIP and MCP joints.  ROM normal bilateral wrists, shoulders, elbows, hips and ankles.  Significant effusion noted in right knee.  Decreased ROM in right knee.  Warmth and tenderness to palpation of the right knee.  Warmth and tenderness to palpation with visible erythema over the dorsal right foot.  Neuro: No obvious focal neurological deficits.  Psych: nl judgement, orientation, memory, affect.    DATA:  Outside Records: YES  Outside Xrays: YES  CBC:  Recent Labs   Lab Test 08/17/23  0826 08/17/23  0657 08/16/23  2349   WBC 23.1* 24.7* 28.6*   RBC 3.15* 3.16* 3.39*   HGB 10.8* 10.6* 11.5*   HCT 30.5* 31.0* 33.0*   MCV 97 98 97   MCH 34.3* 33.5* 33.9*   MCHC 35.4 34.2 34.8   RDW 13.3 13.2 13.2    446 451*       BMP:  Recent Labs   Lab Test 08/17/23  0657 08/16/23  2349    129*   POTASSIUM 3.8 4.3   CHLORIDE 101 95*   CO2 26 24   ANIONGAP 10 10   * 135*   BUN 7.2 10.6   CR 0.45* 0.42*   GFRESTIMATED >90 >90   KAIT 9.3 9.4       LFT:  Recent Labs   Lab Test 08/17/23  0657 08/16/23  2349   PROTTOTAL 5.4* 6.0*   ALBUMIN 2.9* 3.3*   BILITOTAL 0.3 0.3   ALKPHOS 60 67   AST 14 12   ALT 9 9       No results found for: CKTOTAL  No results found for: TSH  Lab Results   Component Value Date    URIC 2.3 08/17/2023       Inflammatory markers  No results found for: CRP  Lab Results   Component Value Date    SED 47 08/17/2023    SED 38 08/16/2023     No results found for: LACHO    UA RESULTS:  No results for input(s): COLOR, APPEARANCE, URINEGLC, URINEBILI, URINEKETONE, SG, UBLD, URINEPH, PROTEIN, UROBILINOGEN, NITRITE, LEUKEST, RBCU, WBCU in the last 05136 hours.      Autoimmunity labs:     No results found for: RHF  No results found for: CCPIGG  No results found for: ANCA  No results found for: D9KYSNP  No results found for: R7PKQRL  No results found for: CARLEE  No results found for: DNA  No results found for: RNPIGG, SMIGG,  SSAIGG, SSBIGG, SCLIGG    IMAGING:

## 2023-08-17 NOTE — PHARMACY-ADMISSION MEDICATION HISTORY
Pharmacist Admission Medication History    Admission medication history is complete. The information provided in this note is only as accurate as the sources available at the time of the update.    Medication reconciliation/reorder completed by provider prior to medication history? Yes    Information Source(s): Patient via phone, care everywhere of discharge summary on 8/11/23- recent admission at [facility] on 8/4/23    Pertinent Information:   + Patient was able to name all the medications listed below except citalopram. _  + Citalopram: pt doesn't know the name although he stated that he takes 1 and 1/2 tablet every day  +Patient ran out of acetaminophen so he has not been taking it for a couple of days.  + Lidocaine patch, ketamine-gabapentin-lidocaine gel, Senokot S, nitroglycerin and Omeprazole were prescribed per discharge summary 8/11/23. However, patient denied taking them at home.     Changes made to PTA medication list:  Added: All listed below  Deleted: None  Changed: None    Medication Affordability: N/A       Allergies reviewed with patient and updates made in EHR: yes    Medication History Completed By: Cassi Gonzalez RPH 8/17/2023 11:31 AM    Prior to Admission medications    Medication Sig Last Dose Taking? Auth Provider Long Term End Date   acetaminophen (TYLENOL) 500 MG tablet Take 1,000 mg by mouth 3 times daily Past Week Yes Unknown, Entered By History     citalopram (CELEXA) 20 MG tablet Take 30 mg by mouth daily 8/16/2023 Yes Unknown, Entered By History     EPINEPHrine (SYMJEPI) 0.3 MG/0.3ML Inject 0.3 mg into the muscle as needed (bee stings) More than a month Yes Unknown, Entered By History     ibuprofen (ADVIL/MOTRIN) 400 MG tablet Take 400 mg by mouth 3 times daily 8/16/2023 Yes Unknown, Entered By History     oxyCODONE (ROXICODONE) 5 MG tablet Take 2.5-5 mg by mouth every 6 hours as needed for severe pain 8/16/2023 Yes Unknown, Entered By History     tetrahydrozoline (VISINE) 0.05 % ophthalmic  solution Place 1 drop into both eyes as needed More than a month Yes Unknown, Entered By History

## 2023-08-17 NOTE — PHARMACY-VANCOMYCIN DOSING SERVICE
"Pharmacy Vancomycin Initial Note  Date of Service 2023  Patient's  1965  58 year old, male    Indication: Bone and Joint Infection    Current estimated CrCl = Estimated Creatinine Clearance: 158.9 mL/min (A) (based on SCr of 0.42 mg/dL (L)).    Creatinine for last 3 days  2023: 11:49 PM Creatinine 0.42 mg/dL    Recent Vancomycin Level(s) for last 3 days  No results found for requested labs within last 3 days.      Vancomycin IV Administrations (past 72 hours)        No vancomycin orders with administrations in past 72 hours.                    Nephrotoxins and other renal medications (From now, onward)      Start     Dose/Rate Route Frequency Ordered Stop    23 0200  vancomycin (VANCOCIN) 1,250 mg in 0.9% NaCl 250 mL intermittent infusion         1,250 mg  over 90 Minutes Intravenous EVERY 12 HOURS 23 0001      23 0000  piperacillin-tazobactam (ZOSYN) 3.375 g vial to attach to  mL bag        Note to Pharmacy: For [facility], [facility] and [facility]: For Zosyn-naive patients, use the \"Zosyn initial dose + extended infusion\" order panel.    3.375 g  over 30 Minutes Intravenous EVERY 6 HOURS 23 2332              Contrast Orders - past 72 hours (72h ago, onward)      None            [employer] Prediction of Planned Initial Vancomycin Regimen  Loading dose: N/A  Regimen: 1250 mg IV every 12 hours.  Start time: 02:00 on 2023  Exposure target: AUC24 (range)400-600 mg/L.hr   AUC24,ss: 463 mg/L.hr  Probability of AUC24 > 400: 65 %  Ctrough,ss: 12 mg/L  Probability of Ctrough,ss > 20: 18 %  Probability of nephrotoxicity (Lodise SHAKIR ): 7 %          Plan:  Start vancomycin 1250 mg IV q12h.   Vancomycin monitoring method: AUC  Vancomycin therapeutic monitoring goal: 400-600 mg*h/L  Pharmacy will check vancomycin levels as appropriate in 1-3 Days.    Serum creatinine levels will be ordered daily for the first week of therapy and at least twice weekly for subsequent weeks.      Marybel BIGGS" Riley [facility]

## 2023-08-17 NOTE — H&P
[facility]    History and Physical - Hospitalist Service       Date of Admission:  8/16/2023    Assessment & Plan      59 Y/O M with PMHx of back pain on oxycodone, peripheral neuropathy who presents from OSH to evaluate polyarthritis.     # Polyarthritis  # Leukocytosis  # Elevated Inflammatory markers   - He Started having Sx a couple of weeks ago 08/04. He was seen at OSH ER initially then due to Rt hand swelling that progress to his Rt elbow and Rt shoulder swelling. They did evalaute with a CT angio and there was concern for SVC syndrome and was started on AC. Vascular consulted and did an US with no evidence of a DVT. It was determined that was likely related to artifact and AC stopped.  He had elevated CRP and ESR then with leukocytosis and was started on Abx then. Ortho consulted and did aspiration of fluids from Rt elbow and Rt shoulder that were neg for crystals and no growth. TTE neg for endocarditis. MELISSA, CCP, Anti DNA, Lyme, Chlamydia/Gonorrhea all neg then.   CT C/A/P was done then and showed thickened distal esophagus with small paraesophageal lymph node. EGD done 08/07/23 showed potential spasm. Started on SL Nitroglycerin. Pt was discharged with PPI and Nitroglycerin and follow up with Rheumatology if occurred again.   He presented again 08/16 due to Rt foot pain and swelling with swelling of the Rt knee. Ultrasound neg for DVT. Was treated with IV Vanc/Zosyn with fluids and pain control and got a dose of methylprednisolone. Sent here for further evaluation with need for Rheumatology consult.    Plan:  - Rheumatology consulted. Will defer further labs in AM  - Ortho consulted for possibly tapping his Rt knee  - X ray of Rt knee  - Continue Vanc and Zosyn for now SOT 08/16 Can de escalate in AM   - Fluids for now 100 ml/h for 12 hours  - Pain control with PRN oxy and IV dilaudid with PRN Tylenol  - Basic labs ordered   - Blood Cx ordered  - CRP/ESR   -  Hepatitis B/C Panel        # Recent dx of esophageal spasm:  - CT C/A/P was done then and showed thickened distal esophagus with small paraesophageal lymph node. EGD done 08/07/23 showed potential spasm. Started on SL Nitroglycerin. Pt was discharged with PPI and Nitroglycerin. Pt is not taking these medications with no Sx currently. CTM     # Tobacco use disorder:  - Nicotine patches ordered     # Alcohol use disorder  - CTM. No withdrawal Sx for now     # Chronic back pain 2/2 Osteoarthritis   # Neuropathy  # Depression   - Takes Oxycodone with a prescriber outpt. Will treat pain as above for now before transition to home dose meds (2.5-5 mg QID)  - Continue PTA Citalopram     # Hyponatremia:  - 100 ml/h NS for now and repeat in AM        Diet:  NPO over night   DVT Prophylaxis: Mechanical ppx for now   Gallardo Catheter: Not present  Lines: None     Cardiac Monitoring: None  Code Status:  Full code    Clinically Significant Risk Factors Present on Admission                                  Disposition Plan    Unknown        TRACEY KIDD MD  Hospitalist Service  [facility]  Securely message with [employer] (more info)  Text page via [employer] Paging/Directory     ______________________________________________________________________    Chief Complaint   Leg pain     History is obtained from the patient and chart review     History of Present Illness   59 Y/O M with PMHx of back pain on oxycodone, peripheral neuropathy who presents from OSH to evaluate polyarthritis.    He Started having Sx a couple of weeks ago 08/04. He was seen at OSH ER initially then due to Rt hand swelling that progress to his Rt elbow and Rt shoulder swelling. They did evalaute with a CT angio and there was concern for SVC syndrome and was started on AC. Vascular consulted and did an US with no evidence of a DVT. It was determined that was likely related to artifact and AC stopped.  He had elevated CRP and  ESR then with leukocytosis and was started on Abx then. Ortho consulted and did aspiration of fluids from Rt elbow and Rt shoulder that were neg for crystals and no growth. TTE neg for endocarditis. MELISSA, CCP, Anti DNA, Lyme, Chlamydia/Gonorrhea all neg then.     CT C/A/P was done then and showed thickened distal esophagus with small paraesophageal lymph node. EGD done 08/07/23 showed potential spasm. Started on SL Nitroglycerin. Pt was discharged with PPI and Nitroglycerin and follow up with Rheumatology if occurred again.       He presented again 08/16 due to Rt foot pain and swelling with swelling of the Rt knee. Ultrasound neg for DVT. Was treated with IV Vanc/Zosyn with fluids and pain control and got a dose of methylprednisolone. Sent here for further evaluation with need for Rheumatology consult.    Pt is currently stable with no fever, chills, SOB, CP, N/V/D     Past Medical History    No past medical history on file.    Past Surgical History   No past surgical history on file.    Prior to Admission Medications   None        Review of Systems    The 5 point Review of Systems is negative other than noted in the HPI    Physical Exam   Vital Signs: Temp: 98.9  F (37.2  C) Temp src: Oral BP: 98/67 Pulse: 80   Resp: 18 SpO2: 98 % O2 Device: None (Room air)    Weight: 0 lbs 0 oz    Constitutional: Lying in bed with no acute distress   Respiratory: On RA with no added sounds   GI: NTTP with positive BS   Skin: No skin rash     Medical Decision Making       80 MINUTES SPENT BY ME on the date of service doing chart review, history, exam, documentation & further activities per the note.      Data   ------------------------- PAST 24 HR DATA REVIEWED -----------------------------------------------

## 2023-08-17 NOTE — PROGRESS NOTES
"  ADMISSION to [facility] UNIT:    Elijah Beach was admitted from [facility] ED for sepsis/ swollen joints.  2 RN skin assessment: completed by Cheryl HANDLEY  Result of skin assessment and interventions/actions: scattered bruising on left arm, chest redness, scab on left foot.  Height, weight: completed? Yes, height 5'8\" , 58.6 kg  Patient belongings & admission documents: see Flowsheets, completed? Yes.  MDRO education added to care plan: yes   "

## 2023-08-17 NOTE — PLAN OF CARE
Physical Therapy Discharge Summary    Reason for therapy discharge:    All goals and outcomes met, no further needs identified.    Progress towards therapy goal(s). See goals on Care Plan in [facility] electronic health record for goal details.  Goals met    Therapy recommendation(s):    Continued therapy is recommended.  Rationale/Recommendations:  Would benefit from home PT for home safety evaluation and progression to be able to access full home.

## 2023-08-17 NOTE — PROGRESS NOTES
"BP 98/67 (BP Location: Left arm)   Pulse 80   Temp 98.9  F (37.2  C) (Oral)   Resp 18   Ht 1.676 m (5' 5.98\")   Wt 58.6 kg (129 lb 3 oz)   SpO2 98%   BMI 20.86 kg/m         Pt is alert and oriented x4, calm and cooperative. Denies SOB and chest pain, denies numbness and tingling. R PIV infusing 100ml/hr NS. Schedule Zosyn and Vancomycin antibiotics was administer on this shift. Pt voids spontaneously without difficulty and use urinal at bed side. Pt not out of bed on this shift. Physical therapy consult to determine activity level. Per previous nurse report, pt is assist x1 with gait belt and walker. Pt c/o of pain in lower back, BL hips, and BL knees. Pt has swelling in right foot, scattered brushing on left arm, redness in the chest and a scab on the left foot. Per pt report last BM 8/15/23. Pt is continent of bowel and bladder. Call light within reach and able to make known needs. Plan of care ongoing.   "

## 2023-08-17 NOTE — PLAN OF CARE
Goal Outcome Evaluation:      Plan of Care Reviewed With: patient    Overall Patient Progress: improvingOverall Patient Progress: improving     Pt alert and oriented x4,pain 7/10 to knee, gave oxy x1. Declined ice/heat. Ate 100% dinner. Reports starting to feel better overall. Pt able to make needs known,went down for xray. Has more labs ordered. On IV antibiotics, tolerating well. Continue POC.

## 2023-08-17 NOTE — CONSULTS
[facility]  Orthopedic Surgery Consult    Name: Elijah Beach  Age: 58 year old  MRN: [id number]  YOB: 1965    Reason for Consult: Right septic knee rule out     Requesting Provider: Fransico Pino MD    Assessment and Plan:     Assessment:  58-year-old male with right knee swelling for 2 to 3 days.  Orthopedics consulted for right knee septic joint rule out.    Been seen in outside hospital and discharged on 08/11/2023 with a neutral monarticular by arthralgia.  He had a right elbow aspiration and right shoulder aspiration with no crystals or culture growth.  In this setting, his right knee pain is unlikely to be a septic infection.  Also endorses that the redness, swelling has been gradually improving since yesterday.  He is also able to range the knee right knee from 0 to 150 degrees actively and passively 0 to 180 degrees.  In this setting, we have a low suspicion for septic joint infection.    However, he has an elevated WBC count to 23, , ESR to 47.  He has remained afebrile although he is on antibiotics including Vanco and Zosyn.  Given his elevated inflammatory markers we proceeded with a right knee joint aspiration today.     Brief Procedure Note:  After verbal informed consent was obtained, and the patient elected to proceed, a brief time out was held in accordance with hospital policy confirming the correct patient, procedure, site, and side. The right knee was then prepped and draped in the usual sterile fashion using chlorhexidine. The 50cc needle was then entered via a lateral suprapatellar approach with a 18 gauge needle. The joint was then aspirated with return of 15 mL of serosanguinous fluid. The needle was withdrawn, and a sterile bandage was placed. The patient tolerated the procedure well and no immediate complications were observed.     We sent the fluid for cultures, cell count, Gram stain. Gram stain negative. No crystals found. 32,530 total  cells. Cultures pending. He is unlikely to have septic arthritis at this time. We can monitor cultures and clinical symptoms for now.     We will peripherally follow cultures for this patient at this time. Please reconsult us with any questions/concerns or if clinical course changes.     Plan:  - Plan for OR: None  - Anticoagulation/DVT prophylaxis: Defer to primary  - Antibiotics: Defer to primary   - Imaging: Bilateral knees x-rays reviewed today which shows slight effusion the right knee.  No hardware  - Activity: No restrictions  - Weight bearing: Weightbearing as tolerated  - Pain control: Defer to primary  - Diet: Cleared from our standpoint  - Follow-up: Pending  - Disposition: Pending    Staff: Jaime Loera MD    Respectfully,    Zhen Mancia MD  Orthopedic Surgery PGY1  [phone]    Please page me directly with any questions/concerns during regular weekday hours before 5 pm. If there is no response, if it is a weekend, or if it is during evening hours then please page the orthopedic surgery resident on call.    History of Present Illness:     Patient was seen and examined by me. History, PMH, Meds, SH, complete ROS (10 organ systems) and PE reviewed with patient and prior medical records.      50-year-old male who presents with 2-day history of right knee swelling.  He states that he has been febrile with chills.  He states he has had bilateral knee pain and bilateral ankle pain.  He states that the top of his right foot hurts.  Per chart review he was discharged on 08 11 with [facility] monarticular prior arthralgia.  During his hospital stay he had a right elbow and right shoulder aspiration with no crystals, culture growth.  He states that his right knee swelling is drastically improved overnight.  States that his range of motion of his right knee is drastically resolved.     Past Medical History:     No past medical history on file.    Past Surgical History:     No past surgical history on  "file.    Social History:          Family History:     No family history on file.    Medications:     Current Facility-Administered Medications   Medication    acetaminophen (TYLENOL) tablet 650 mg    citalopram (celeXA) tablet 30 mg    HYDROmorphone (PF) (DILAUDID) injection 0.3 mg    lidocaine (LMX4) cream    lidocaine 1 % 0.1-1 mL    melatonin tablet 1 mg    naloxone (NARCAN) injection 0.2 mg    Or    naloxone (NARCAN) injection 0.4 mg    Or    naloxone (NARCAN) injection 0.2 mg    Or    naloxone (NARCAN) injection 0.4 mg    ondansetron (ZOFRAN ODT) ODT tab 4 mg    Or    ondansetron (ZOFRAN) injection 4 mg    oxyCODONE (ROXICODONE) tablet 5 mg    piperacillin-tazobactam (ZOSYN) 3.375 g vial to attach to  mL bag    polyethylene glycol (MIRALAX) Packet 17 g    sodium chloride (PF) 0.9% PF flush 3 mL    sodium chloride (PF) 0.9% PF flush 3 mL    sodium chloride 0.9% infusion    vancomycin (VANCOCIN) 1,250 mg in 0.9% NaCl 250 mL intermittent infusion       Allergies:     Allergies   Allergen Reactions    Bees Anaphylaxis    Clindamycin Itching       Review of Systems:     A comprehensive 10 point review of systems (constitutional, ENT, cardiac, peripheral vascular, respiratory, GI, , musculoskeletal, skin, neurological) was performed and found to be negative except as described in this note.      Physical Exam:     Vital Signs: /74 (BP Location: Left arm)   Pulse 74   Temp 98.9  F (37.2  C) (Oral)   Resp 15   Ht 1.767 m (5' 9.57\")   Wt 58.6 kg (129 lb 3 oz)   SpO2 97%   BMI 18.77 kg/m    General: Resting comfortably in bed, awake, alert, no apparent distress, appears stated age.    Musculoskeletal:  RLE: No gross deformity. Skin intact. Slight effusion of knee joint.  Passive range of motion from 0-180.  Active range of motion 0-135.  Full active/passive ROM of all joints w/o pain or crepitus. Fires Quad/TA/Gastroc/EHL/FHL SILT in femoral, sural, saphenous, deep peroneal, superficial peroneal, and " tibial nerve distributions. Dorsalis pedis 2+ and foot warm and well-perfused   LLE: No gross deformity. Skin intact. Slight effusion of knee joint.  Active and assive range of motion from 0-180.  Full active/passive ROM of all joints w/o pain or crepitus. Fires Quad/TA/Gastroc/EHL/FHL SILT in femoral, sural, saphenous, deep peroneal, superficial peroneal, and tibial nerve distributions. Dorsalis pedis 2+ and foot warm and well-perfused      Imaging:     Bilateral knee x-rays reviewed which show significant on the right, no fracture dislocation.  Both knees are native joints without any hardware.     Data:     CBC:  Lab Results   Component Value Date    WBC 23.1 (H) 08/17/2023    HGB 10.8 (L) 08/17/2023     08/17/2023     BMP:  Lab Results   Component Value Date     08/17/2023    POTASSIUM 3.8 08/17/2023    CHLORIDE 101 08/17/2023    CO2 26 08/17/2023    BUN 7.2 08/17/2023    CR 0.45 (L) 08/17/2023    ANIONGAP 10 08/17/2023    KAIT 9.3 08/17/2023     (H) 08/17/2023     Inflammatory Markers:  Lab Results   Component Value Date    WBC 23.1 (H) 08/17/2023    WBC 24.7 (H) 08/17/2023    WBC 28.6 (H) 08/16/2023    SED 47 (H) 08/17/2023    SED 38 (H) 08/16/2023

## 2023-08-17 NOTE — PROGRESS NOTES
[facility]    Medicine Progress Note - Hospitalist Service, GOLD TEAM 18    Date of Admission:  8/16/2023    Assessment & Plan   59 yo gentleman with PMHx of back pain on oxycodone, peripheral neuropathy who presented to [facility] from OSH on 8/16 for evaluation of polyarthritis.      #  Polyarthritis  #  Leukocytosis  #  Elevated Inflammatory markers   ---   Pt started having Symptoms a couple of weeks ago 8/04.   ---   He was seen at OSH ER initially due to Rt hand swelling that progress to his Rt elbow and Rt shoulder swelling.   ---   CT angio at OSH w/ concern for SVC syndrome and was started on AC.  Vascular consulted and did an US with no evidence of a DVT.  It was determined that was likely related to artifact and AC stopped.    ---   He had elevated CRP and ESR then with leukocytosis and was started on Abx then.   ---   Ortho consulted and did aspiration of fluids from Rt elbow and Rt shoulder that were neg for crystals and no growth.   ---   TTE neg for endocarditis. MELISSA, CCP, Anti DNA, Lyme, Chlamydia/Gonorrhea all neg then.   ---   CT C/A/P was done and it showed thickened distal esophagus with small paraesophageal lymph node.  EGD on 08/07/23 showed potential spasm. Started on SL Nitroglycerin.   ---   Pt was discharged from OSH with PPI and Nitroglycerin.    ---   He was advised to follow up with Rheumatology if his symptoms return.   ---   He returned to [facility] on 8/16 due to Rt foot pain and Rt knee swelling.  Ultrasound neg for DVT.  He was treated with IV Vanc/Zosyn, IVF, pain med and a dose of methylprednisolone.   ---   Transferred to [facility] for further eval by Rheumatology service.  ---   Rheumatology consulted  ---   Ortho consulted for possible right knee aspiration  ---   X ray b/l knee 8/17 negative for fracture or compartmental narrowing, no evidence for osteomyelitis, tiny right knee joint effusion (unlikely to represent  a septic arthropathy) and no effusion on the left.   ---   Continue  ml/h for 12 hours  ---   Pain control with PRN oxy and IV dilaudid with PRN Tylenol  ---   NGTD from blood cx x 2 collected 8/16  ---   Hepatitis B/C serology negative   ---   Continue Vanc and Zosyn  ---   Ortho and Rhematology consult services to see pt today    #  Leukocytosis  ---   Likely a combination of steroid and stress demargination  ---   Currently no known active infection but ortho to tap pt's right knee     #  Recent dx of esophageal spasm:  ---   CT C/A/P at OSH showed thickened distal esophagus with small paraesophageal lymph node.   ---   EGD at OSH 8/07/23 showed potential spasm.   ---   Started on SL Nitroglycerin.   ---   Pt was discharged with PPI and Nitroglycerin.   ---   Pt is not taking these medications with no Sx currently.   ---   CTM      #  Tobacco use disorder:  ---   Nicotine patches ordered      #  Alcohol use disorder  ---   CTM.   ---   No withdrawal Sx for now      #  Chronic back pain 2/2 Osteoarthritis   #  Neuropathy  #  Depression   ---   Takes Oxycodone with a prescriber outpt.   ---   Will treat pain as above for now before transition to home dose meds (2.5-5 mg QID)  ---   Continue PTA Citalopram      #   Hyponatremia:  ---   Due to hypovolemia  ---   Resolved w/ isotonic IVF hydration            Diet:  Regular diet  DVT Prophylaxis: Mechanical ppx for now   Gallardo Catheter: Not present  Lines: None     Cardiac Monitoring: None  Code Status:  Full code  Disposition:   Per Rheumatology rec            Yamilka Bateman MD  Hospitalist Service, GOLD TEAM 18  [facility]  Securely message with [employer] (more info)  Text page via [employer] Paging/Directory   See signed in provider for up to date coverage information  ______________________________________________________________________    Interval History   Right knee swelling better  C/o diffuse joints pain  Over all  feels better c/w yesterday      Physical Exam   Vital Signs: Temp: 98.9  F (37.2  C) Temp src: Oral BP: 130/74 Pulse: 74   Resp: 15 SpO2: 97 % O2 Device: None (Room air)    Weight: 129 lbs 3.03 oz  General: aao x 3, NAD.  HEENT:  NC/AT, PERRL, EOMI, neck supple, no thyromegaly, op clear, mmm.  CVS:  NL s 1 and s2, no m/r/g.  Lungs:  CTA B/L.   Abd:  Soft, + bs, NT, no rebound or gaurding, no fluid shift.  Ext:  No c/c.  Lymph:  No edema.  Neuro:  Nonfocal.  Musculoskeletal: No calf tenderness to palpation.    Skin:  No rash.  Psychiatry:  Mood and affect appropriate.        Data     I have personally reviewed the following data over the past 24 hrs:    23.1 (H)  \   10.8 (L)   / 423     137 101 7.2 /  126 (H)   3.8 26 0.45 (L) \     ALT: 9 AST: 14 AP: 60 TBILI: 0.3   ALB: 2.9 (L) TOT PROTEIN: 5.4 (L) LIPASE: N/A     Procal: N/A CRP: 90.76 (H) Lactic Acid: N/A         Imaging results reviewed over the past 24 hrs:   Recent Results (from the past 24 hour(s))   XR Knee Left 3 Views    Narrative    EXAM: XR KNEE RIGHT 3 VIEWS, XR KNEE LEFT 3 VIEWS  LOCATION: [facility]  DATE: 8/17/2023    INDICATION: Septic knee, infection, pain and swelling.  COMPARISON: None.      Impression    IMPRESSION:   Both knees negative for fracture or compartmental narrowing. No evidence for osteomyelitis. Tiny right knee joint effusion. This is unlikely to represent a septic arthropathy. No effusion on the left.   XR Knee Right 3 Views    Narrative    EXAM: XR KNEE RIGHT 3 VIEWS, XR KNEE LEFT 3 VIEWS  LOCATION: [facility]  DATE: 8/17/2023    INDICATION: Septic knee, infection, pain and swelling.  COMPARISON: None.      Impression    IMPRESSION:   Both knees negative for fracture or compartmental narrowing. No evidence for osteomyelitis. Tiny right knee joint effusion. This is unlikely to represent a septic arthropathy. No effusion on the left.

## 2023-08-17 NOTE — PROGRESS NOTES
Pt is alert and oriented x4, calm and cooperative. Denies SOB and chest pain, denies numbness and tingling. Pt c/o of pain in lower back, BL hips, and BL knees. Pt has swelling in right foot, scattered brushing on left arm, redness in the chest and a scab on the left foot. Pt is assist x1 with gait belt and walker. Per pt last BM 8/15/23, continent of bowel and bladder.

## 2023-08-17 NOTE — PROGRESS NOTES
08/17/23 0800   Appointment Info   Signing Clinician's Name / Credentials (PT) Cheryl Wheat DPT   Living Environment   People in Home alone  (sister helps some)   Current Living Arrangements house   Home Accessibility stairs to enter home;stairs within home   Number of Stairs, Main Entrance 10   Stair Railings, Main Entrance railing on right side (ascending)   Number of Stairs, Within Home, Primary greater than 10 stairs  (14; laundry in basement could wait a little bit before going down)   Stair Railings, Within Home, Primary railings on both sides of stairs   Living Environment Comments Pt from home alone, sister could help some but not consistently   Self-Care   Usual Activity Tolerance moderate  (FWW baseline d/t pain)   Current Activity Tolerance fair   Equipment Currently Used at Home walker, rolling   Fall history within last six months no   Activity/Exercise/Self-Care Comment Limited d/t pain, states pain in joints moves around   General Information   Onset of Illness/Injury or Date of Surgery 08/16/23   Referring Physician Fransico Pino MD   Patient/Family Therapy Goals Statement (PT) get back on my feet and do normal everyday things; has barely been OOH all summer d/t pain   Pertinent History of Current Problem (include personal factors and/or comorbidities that impact the POC) 57 Y/O M with PMHx of back pain on oxycodone, peripheral neuropathy who presents from OSH to evaluate polyarthritis.   Existing Precautions/Restrictions fall   Weight-Bearing Status - LUE full weight-bearing   Weight-Bearing Status - RUE full weight-bearing   Weight-Bearing Status - LLE full weight-bearing   Weight-Bearing Status - RLE full weight-bearing   General Observations Pt seated EOB ind on PT arrival   Cognition   Affect/Mental Status (Cognition) WFL   Pain Assessment   Patient Currently in Pain Yes, see Vital Sign flowsheet  (7/10 currently)   Integumentary/Edema   Integumentary/Edema Comments does have slight  visible edema to R knee compared to L pt reports edema and redness has much improved today   Posture    Posture Not impaired   Range of Motion (ROM)   Range of Motion ROM deficits secondary to pain   ROM Comment decreased ROM R knee compared to L   Strength (Manual Muscle Testing)   Strength (Manual Muscle Testing) No deficits observed during functional mobility   Bed Mobility   Comment, (Bed Mobility) ind bed mob   Transfers   Comment, (Transfers) nehal transfers w/ FWW w/ increased pain   Gait/Stairs (Locomotion)   Comment, (Gait/Stairs) nehal amb w/ FWW w/ increased pain   Balance   Balance no deficits were identified   Sensory Examination   Sensory Perception Comments reports baseline B LE neuropathy   Clinical Impression   Criteria for Skilled Therapeutic Intervention Evaluation only  (1x evaluation and treatment)   PT Diagnosis (PT) impaired functional mobility   Influenced by the following impairments pain   Functional limitations due to impairments impaired transfers, amb, and stairs   Clinical Presentation (PT Evaluation Complexity) Stable/Uncomplicated   Clinical Presentation Rationale per clinical judgment   Clinical Decision Making (Complexity) low complexity   Planned Therapy Interventions (PT) gait training;stair training;transfer training   Anticipated Equipment Needs at Discharge (PT)   (none)   Risk & Benefits of therapy have been explained evaluation/treatment results reviewed;care plan/treatment goals reviewed;risks/benefits reviewed;current/potential barriers reviewed;participants voiced agreement with care plan;participants included;patient   Clinical Impression Comments Pt ind w/ use of FWW w/ mobility and is steady despite the pain, motivated to return home when medically ready   PT Total Evaluation Time   PT Eval, Low Complexity Minutes (64680) 5   Physical Therapy Goals   PT Frequency One time eval and treatment only   PT Predicted Duration/Target Date for Goal Attainment 08/17/23   PT Goals  Bed Mobility;Transfers;Gait;Stairs   PT: Bed Mobility Independent;Supine to/from sit;Rolling;Goal Met   PT: Transfers Modified independent;Sit to/from stand;Bed to/from chair;Assistive device;Goal Met   PT: Gait Modified independent;Assistive device;Rolling walker;Greater than 200 feet;Goal Met   PT: Stairs Supervision/stand-by assist;9 stairs;Rail on right;Goal Met   Interventions   Interventions Quick Adds Therapeutic Activity;Gait Training   Therapeutic Activity   Therapeutic Activities: dynamic activities to improve functional performance Minutes (79922) 10   Symptoms Noted During/After Treatment Increased pain   Treatment Detail/Skilled Intervention Pt supine in bed on PT arrival, agrees to work w/ PT but is experiencing pain in R LE. Pt completes sup>sit EOB ind. Ortho entered at this point and requested pt go for STAT imaging. PT did state would come back later AM and pt agrees to this. On re-approach pt again supine in bed and completes sup>sit EOB ind w/ increased pain. STS at FWW nehal w/ increased pain but stable. See gait training below. Once back in room pt completes STS nehal, amb 5ft and completes SPT w/ FWW. Sits EOB and requests to stay sitting upright. Declines the need for further PT, is still experiencing pain but feels he is moving better than when he came to hospital and is comfortable w/ DC home once medically ready.   Gait Training   Gait Training Minutes (21577) 28   Symptoms Noted During/After Treatment (Gait Training) increased pain   Treatment Detail/Skilled Intervention Pt amb 220ft w/ FWW progression from CGA-nehal w/ duration. He does have pain but is able to safely and securely WB through each LE w/ use of FWW assisting which is what he was using at baseline. Pt amb slow and steady forward and has no evidence of instability. Pt brought down to stairs via WC at this time and educated on side stepping on stairs to decrease strain on knees and be able to use B UEs on single rail. Pt able to  complete 9 stairs in this fashion w/ encouragement for step to pattern and does well w/ this again despite the pain. Pt completes SPT to WC and sits and rests, wheeled back to room.   PT Discharge Planning   PT Plan DC PT   PT Discharge Recommendation (DC Rec) home;home with home care physical therapy   PT Rationale for DC Rec may benefit from home therapy to help improve accessibility to home environment as he does report of feeling stuck in the home and is having intermittent trouble mobilizing around his home, does also have 14 stairs to basement which he will have to be able to progress to use for full access to his home.   PT Brief overview of current status ind bed mob, nehal w/ FWW   Total Session Time   Timed Code Treatment Minutes 38   Total Session Time (sum of timed and untimed services) 43

## 2023-08-17 NOTE — PLAN OF CARE
Goal Outcome Evaluation:    Patient alert/oriented x4. Room air. VSS. Continent bowel and bladder.Last BM 8/15. Right PIV running  ml/hour.Assist 1 with walker/gait belt. Had synovial fluid drawn from right knee, awaiting results. No skin issues Call light within reach, able to make needs known.

## 2023-08-17 NOTE — UTILIZATION REVIEW
Admission Status; Secondary Review Determination    Under the authority of the Utilization Management Committee, the utilization review process indicated a secondary review on the above patient. The review outcome is based on review of the medical records, discussions with staff, and applying clinical experience noted on the date of the review.    (x) Inpatient Status Appropriate - This patient's medical care is consistent with medical management for inpatient care and reasonable inpatient medical practice.    RATIONALE FOR DETERMINATION: 58-year-old male with significant back pain on oxycodone, peripheral neuropathy who this month has developed episodes of significant joint swelling including right hand elbow and shoulder with outpatient evaluation.  Aspiration was negative for crystals and no bacterial growth.TTE neg for endocarditis. MELISSA, CCP, Anti DNA, Lyme, Chlamydia/Gonorrhea all neg then.  Patient now developing significant joint swelling with concern for sepsis, marked leukocytosis with joint aspiration revealing greater than 32,000 nucleated cells 97% neutrophils with a brown cloudy appearing fluid and significant elevated inflammatory markers.  Patient requiring broad-spectrum IV antibiotics as well as rheumatology consultation appropriate for inpatient care.    At the time of admission with the information available to the attending physician more than 2 nights Hospital complex care was anticipated, based on patient risk of adverse outcome if treated as outpatient and complex care required. Inpatient admission is appropriate based on the Medicare guidelines.    This document was produced using voice recognition software    The information on this document is developed by the utilization review team in order for the business office to ensure compliance. This only denotes the appropriateness of proper admission status and does not reflect the quality of care rendered.    The definitions of Inpatient  Status and Observation Status used in making the determination above are those provided in the CMS Coverage Manual, Chapter 1 and Chapter 6, section 70.4.    Sincerely,    Julio Dumont MD  Utilization Review  Physician Advisor  [facility].

## 2023-08-18 LAB
ALBUMIN SERPL BCG-MCNC: 2.8 G/DL (ref 3.5–5.2)
ALP SERPL-CCNC: 59 U/L (ref 40–129)
ALT SERPL W P-5'-P-CCNC: 20 U/L (ref 0–70)
ANION GAP SERPL CALCULATED.3IONS-SCNC: 8 MMOL/L (ref 7–15)
AST SERPL W P-5'-P-CCNC: 34 U/L (ref 0–45)
BILIRUB SERPL-MCNC: <0.2 MG/DL
BUN SERPL-MCNC: 6.1 MG/DL (ref 6–20)
C TRACH DNA SPEC QL PROBE+SIG AMP: NEGATIVE
C TRACH DNA SPEC QL PROBE+SIG AMP: NEGATIVE
CALCIUM SERPL-MCNC: 9 MG/DL (ref 8.6–10)
CHLORIDE SERPL-SCNC: 104 MMOL/L (ref 98–107)
CREAT SERPL-MCNC: 0.45 MG/DL (ref 0.67–1.17)
DEPRECATED HCO3 PLAS-SCNC: 24 MMOL/L (ref 22–29)
ERYTHROCYTE [DISTWIDTH] IN BLOOD BY AUTOMATED COUNT: 13 % (ref 10–15)
GFR SERPL CREATININE-BSD FRML MDRD: >90 ML/MIN/1.73M2
GLUCOSE SERPL-MCNC: 98 MG/DL (ref 70–99)
HCT VFR BLD AUTO: 31.3 % (ref 40–53)
HGB BLD-MCNC: 10.9 G/DL (ref 13.3–17.7)
HIV 1+2 AB+HIV1 P24 AG SERPL QL IA: NONREACTIVE
MCH RBC QN AUTO: 33.9 PG (ref 26.5–33)
MCHC RBC AUTO-ENTMCNC: 34.8 G/DL (ref 31.5–36.5)
MCV RBC AUTO: 97 FL (ref 78–100)
N GONORRHOEA DNA SPEC QL NAA+PROBE: NEGATIVE
N GONORRHOEA DNA SPEC QL NAA+PROBE: NEGATIVE
PLATELET # BLD AUTO: 421 10E3/UL (ref 150–450)
POTASSIUM SERPL-SCNC: 4 MMOL/L (ref 3.4–5.3)
PROT SERPL-MCNC: 5.2 G/DL (ref 6.4–8.3)
RBC # BLD AUTO: 3.22 10E6/UL (ref 4.4–5.9)
SODIUM SERPL-SCNC: 136 MMOL/L (ref 136–145)
WBC # BLD AUTO: 8.9 10E3/UL (ref 4–11)

## 2023-08-18 PROCEDURE — 87591 N.GONORRHOEAE DNA AMP PROB: CPT | Performed by: INTERNAL MEDICINE

## 2023-08-18 PROCEDURE — 85027 COMPLETE CBC AUTOMATED: CPT | Performed by: INTERNAL MEDICINE

## 2023-08-18 PROCEDURE — 250N000012 HC RX MED GY IP 250 OP 636 PS 637: Performed by: INTERNAL MEDICINE

## 2023-08-18 PROCEDURE — 250N000013 HC RX MED GY IP 250 OP 250 PS 637: Performed by: INTERNAL MEDICINE

## 2023-08-18 PROCEDURE — 250N000013 HC RX MED GY IP 250 OP 250 PS 637: Performed by: STUDENT IN AN ORGANIZED HEALTH CARE EDUCATION/TRAINING PROGRAM

## 2023-08-18 PROCEDURE — 120N000002 HC R&B MED SURG/OB UMMC

## 2023-08-18 PROCEDURE — 99233 SBSQ HOSP IP/OBS HIGH 50: CPT | Performed by: INTERNAL MEDICINE

## 2023-08-18 PROCEDURE — 99207 PR NO CHARGE FOLLOW UP PS: CPT | Performed by: INTERNAL MEDICINE

## 2023-08-18 PROCEDURE — 258N000003 HC RX IP 258 OP 636: Performed by: STUDENT IN AN ORGANIZED HEALTH CARE EDUCATION/TRAINING PROGRAM

## 2023-08-18 PROCEDURE — 87491 CHLMYD TRACH DNA AMP PROBE: CPT | Performed by: INTERNAL MEDICINE

## 2023-08-18 PROCEDURE — 80053 COMPREHEN METABOLIC PANEL: CPT | Performed by: STUDENT IN AN ORGANIZED HEALTH CARE EDUCATION/TRAINING PROGRAM

## 2023-08-18 PROCEDURE — 36415 COLL VENOUS BLD VENIPUNCTURE: CPT | Performed by: STUDENT IN AN ORGANIZED HEALTH CARE EDUCATION/TRAINING PROGRAM

## 2023-08-18 PROCEDURE — 250N000011 HC RX IP 250 OP 636: Performed by: STUDENT IN AN ORGANIZED HEALTH CARE EDUCATION/TRAINING PROGRAM

## 2023-08-18 RX ORDER — PREDNISONE 5 MG/1
5 TABLET ORAL DAILY
Status: DISCONTINUED | OUTPATIENT
Start: 2023-09-07 | End: 2023-08-19 | Stop reason: HOSPADM

## 2023-08-18 RX ORDER — PREDNISONE 5 MG/1
10 TABLET ORAL DAILY
Status: DISCONTINUED | OUTPATIENT
Start: 2023-08-28 | End: 2023-08-19 | Stop reason: HOSPADM

## 2023-08-18 RX ORDER — MELOXICAM 7.5 MG/1
15 TABLET ORAL DAILY
Status: DISCONTINUED | OUTPATIENT
Start: 2023-08-18 | End: 2023-08-19 | Stop reason: HOSPADM

## 2023-08-18 RX ORDER — PREDNISONE 5 MG/1
15 TABLET ORAL DAILY
Status: DISCONTINUED | OUTPATIENT
Start: 2023-08-23 | End: 2023-08-19 | Stop reason: HOSPADM

## 2023-08-18 RX ORDER — PREDNISONE 20 MG/1
20 TABLET ORAL DAILY
Status: DISCONTINUED | OUTPATIENT
Start: 2023-08-18 | End: 2023-08-19 | Stop reason: HOSPADM

## 2023-08-18 RX ORDER — PANTOPRAZOLE SODIUM 40 MG/1
40 TABLET, DELAYED RELEASE ORAL
Status: DISCONTINUED | OUTPATIENT
Start: 2023-08-19 | End: 2023-08-19 | Stop reason: HOSPADM

## 2023-08-18 RX ADMIN — ACETAMINOPHEN 1000 MG: 500 TABLET, FILM COATED ORAL at 08:44

## 2023-08-18 RX ADMIN — VANCOMYCIN HYDROCHLORIDE 1250 MG: 10 INJECTION, POWDER, LYOPHILIZED, FOR SOLUTION INTRAVENOUS at 02:13

## 2023-08-18 RX ADMIN — PIPERACILLIN AND TAZOBACTAM 3.38 G: 3; .375 INJECTION, POWDER, LYOPHILIZED, FOR SOLUTION INTRAVENOUS at 00:09

## 2023-08-18 RX ADMIN — ACETAMINOPHEN 1000 MG: 500 TABLET, FILM COATED ORAL at 20:07

## 2023-08-18 RX ADMIN — PIPERACILLIN AND TAZOBACTAM 3.38 G: 3; .375 INJECTION, POWDER, LYOPHILIZED, FOR SOLUTION INTRAVENOUS at 06:07

## 2023-08-18 RX ADMIN — MELOXICAM 15 MG: 7.5 TABLET ORAL at 20:07

## 2023-08-18 RX ADMIN — POLYETHYLENE GLYCOL 3350 17 G: 17 POWDER, FOR SOLUTION ORAL at 08:44

## 2023-08-18 RX ADMIN — PREDNISONE 20 MG: 20 TABLET ORAL at 15:56

## 2023-08-18 RX ADMIN — IBUPROFEN 400 MG: 200 TABLET, FILM COATED ORAL at 08:44

## 2023-08-18 RX ADMIN — CITALOPRAM HYDROBROMIDE 30 MG: 10 TABLET ORAL at 08:44

## 2023-08-18 RX ADMIN — ACETAMINOPHEN 1000 MG: 500 TABLET, FILM COATED ORAL at 13:42

## 2023-08-18 RX ADMIN — IBUPROFEN 400 MG: 200 TABLET, FILM COATED ORAL at 13:42

## 2023-08-18 ASSESSMENT — ACTIVITIES OF DAILY LIVING (ADL)
ADLS_ACUITY_SCORE: 25
ADLS_ACUITY_SCORE: 28
ADLS_ACUITY_SCORE: 28
ADLS_ACUITY_SCORE: 25
ADLS_ACUITY_SCORE: 25
ADLS_ACUITY_SCORE: 28

## 2023-08-18 NOTE — PLAN OF CARE
Goal Outcome Evaluation:      Plan of Care Reviewed With: patient    Overall Patient Progress: improving    Outcome Evaluation: Mild pain, started prednisone      VS: Temp: 97.8  F (36.6  C) Temp src: Oral BP: 129/83 Pulse: 68   Resp: 16 SpO2: 99 % O2 Device: None (Room air)      O2: RA   Output: Not recorded   Last BM: 8/17   Activity: Independent with walker   Skin: Incision R knee   Pain: Mild   Neuro: A&O x4   Dressing: Medipore dressing to R knee   Diet: Reg   LDA: L PIV SL   Equipment: Walker   Plan: Discharge to home pending cultures synovial fluid   Additional Info: Verified w lab that Lyme IgG and IgM CSF Immunoblot unit collect labs are to be collected by provider

## 2023-08-18 NOTE — PLAN OF CARE
Goal Outcome Evaluation:    Patient alert/oriented x4  Room air  VSS  Urinal at bedside  BM 8/17  L PIV SL    Call light within reach, able to make needs known.

## 2023-08-18 NOTE — PLAN OF CARE
Goal Outcome Evaluation:      Plan of Care Reviewed With: patient    Overall Patient Progress: improvingOverall Patient Progress: improving    Outcome Evaluation: See RD progress note for details

## 2023-08-18 NOTE — CONSULTS
Care Management Initial Consult    General Information  Assessment completed with: Elijah Brooks  Type of CM/SW Visit: Initial Assessment    Primary Care Provider verified and updated as needed: Yes   Readmission within the last 30 days: no previous admission in last 30 days      Reason for Consult: discharge planning  Advance Care Planning: Advance Care Planning Reviewed: no concerns identified          Communication Assessment  Patient's communication style: spoken language (English or Bilingual)    Hearing Difficulty or Deaf: no   Wear Glasses or Blind: yes    Cognitive  Cognitive/Neuro/Behavioral: WDL  Level of Consciousness: alert  Arousal Level: opens eyes spontaneously  Orientation: oriented x 4     Best Language: 0 - No aphasia  Speech: clear, spontaneous    Living Environment:   People in home: alone     Current living Arrangements: house      Able to return to prior arrangements: yes  Living Arrangement Comments: has 10 -12 steps into home    Family/Social Support:  Care provided by: self, other (see comments) (sister has been helping)  Provides care for: no one  Marital Status: Single  Sibling(s) (Sister Nidhi Rojas)          Description of Support System: Supportive, Involved    Support Assessment: Adequate family and caregiver support, Adequate social supports (limited support to sister)    Current Resources:   Patient receiving home care services:       Community Resources: None  Equipment currently used at home: walker, rolling  Supplies currently used at home: None    Employment/Financial:  Employment Status: disabled        Financial Concerns: No concerns identified   Referral to Financial Worker: No  Finance Comments: Barely get buy fiancially but do get buy, have what I need    Does the patient's insurance plan have a 3 day qualifying hospital stay waiver?  No    Lifestyle & Psychosocial Needs:  Social Determinants of Health     Tobacco Use: Not on file   Alcohol Use: Not on file   Financial  Resource Strain: Not on file   Food Insecurity: Not on file   Transportation Needs: Not on file   Physical Activity: Not on file   Stress: Not on file   Social Connections: Not on file   Intimate Partner Violence: Not on file   Depression: Not on file   Housing Stability: Not on file       Functional Status:  Prior to admission patient needed assistance:   Dependent ADLs:: Ambulation-walker, Grooming, Bathing (sister gets soapy water and wash cloths to patient and he sponge bathes, sister will also wash hair for patient.)  Dependent IADLs:: Meal Preparation, Laundry, Cleaning (Sister makes sandwiches and preps meals to leave in refrigerator, takes pt to appointmentments and does the laundry.)  Assesssment of Functional Status: Not at baseline with mobility    Mental Health Status:  Mental Health Status: No Current Concerns       Chemical Dependency Status:  Chemical Dependency Status: No Current Concerns             Values/Beliefs:  Spiritual, Cultural Beliefs, Orthodoxy Practices, Values that affect care: no               Additional Information:  Per  chart review:   58-year-old male with significant back pain on oxycodone, peripheral neuropathy who this month has developed episodes of significant joint swelling including right hand elbow and shoulder with outpatient evaluation.  Aspiration was negative for crystals and no bacterial growth.TTE neg for endocarditis. MELISSA, CCP, Anti DNA, Lyme, Chlamydia/Gonorrhea all neg then.  Patient now developing significant joint swelling with concern for sepsis, marked leukocytosis with joint aspiration revealing greater than 32,000 nucleated cells 97% neutrophils with a brown cloudy appearing fluid and significant elevated inflammatory markers.  Patient requiring broad-spectrum IV antibiotics as well as rheumatology consultation appropriate for inpatient care.        Met patient at bedside to introduce role of RNCC and complete initial assessment (see above) . Patient is  "anxious to return home . Patient states \" I want to get back on my feet and do normal everyday things, I have barely been out of the house all summer because of my pain.\" Patient sates he is open to home physical therapy wherever RNCC can find it. If home PT is not available  he maybe able to have his sister take him to OP PT.    Referrals sent to:  [facility]  [address], [address]  [place] [address]  PHONE NUMBER  [phone]    [facility]  [address] [place] [address]  PHONE NUMBER  [phone]    [facility]  [address] [place] [address]  PHONE NUMBER  [phone]    Care Management will remain available to assist with continued DC needs.    Updated PCP and added sibling to list as       ADDENDUM:  [facility] so cannot take this patient  [facility]    Spoke with [facility]  [phone] xt [phone]  Have done some work in [place] request send face Sheet, Hand P  and what patient needs to 0     12:23-all orders faxed to [place]-if they do not call back today we wont hear back until Monday.     Of Note: unable to identify any other HC Agencies in patients geographical area.    Kenisha De Santiago BSN RN CCM  RN Care Coordinator 5 MS and 10 ICU  M [address] [place] [address]  [email or web address]   Office:   [phone]   Pager: [phone]     Weekend [place] Pager:5 ortho,5 Med/Surg & WB ED  [phone]   Weekend 6MS, 8A, 10ICU- Pager: [phone]     For weekend RN care coordinator needs (home discharge with needs including home care, assisted living facility returns, durable medical equip, IV antibiotics  RN Weekend [place] Pager:5 ortho,5 Med/Surg & WB ED   [phone]  RNCC Weekend 6MS, 8A, 10ICU- Pager: [phone]      "

## 2023-08-18 NOTE — PROGRESS NOTES
Brief plan of care note:    Unclear diagnosis at this time.  Repeat arthrocentesis showed WBC at 32,000 with 97% neutrophils.  Crystal analysis seems to have shown urate crystals.  Gout is very unlikely with his uric acid being low at 2.3 and his symptoms are more probably polyarthralgia with migratory phenomenon.  X-ray of sacroiliac joints showed no significant narrowing.  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    #Plan  --Prednisone taper as mentioned below.  20 mg daily for 5 days followed by 15 mg daily for 5 days followed by 10 mg daily for 5 days followed by 7.5 mg daily for 5 days followed by 5 mg daily for 5 days and stop.  -- First-line treatment for peripheral seronegative spondylarthritis is NSAIDs.  We will start the patient on meloxicam 15 mg daily.  --Pantoprazole 40 mg daily for GI prophylaxis.  --Patient has an appointment scheduled with outpatient rheumatologist scheduled next month.  Further management of peripheral seronegative spondylarthritis based on evaluation at that time.  --Rheumatology will sign off at this time.    Nabeel Araujo,  Rheumatology Fellow,  Pager: [phone].    Staff addendum:  Patient discussed on rounds. Not seen by me today. Chart independently reviewed by me. I agree with the assessment and plan outlined above.   Giovanni Dodson MD  Rheumatology

## 2023-08-18 NOTE — PROGRESS NOTES
"/65 (BP Location: Left arm)   Pulse 71   Temp 98.2  F (36.8  C) (Oral)   Resp 17   Ht 1.767 m (5' 9.57\")   Wt 58.6 kg (129 lb 3 oz)   SpO2 99%   BMI 18.77 kg/m       Pt alert and oriented x4. On room air. VSS. calm and cooperative. Denies SOB and chest pain, denies numbness and tingling. On schedule IV antibiotics Zosyn & Vancomycin was administer on this shift and pt is tolerating well. Assist 1 with walker and gait belt. No skin issues. Pt voids spontaneously without difficulty and use urinal at bed side. Urine sample collected and sent to the lab. No BM on this shift. Call light within reach, able to make needs known. Pt reports of improvement. Pt not out of bed on this shift. Plan of care ongoing.      R-PIV is infiltrated and the PIV was removed @ 0630. Flyer was called for PIV replacement.  "

## 2023-08-18 NOTE — PROGRESS NOTES
"CLINICAL NUTRITION SERVICES - ASSESSMENT NOTE     Nutrition Prescription    RECOMMENDATIONS FOR MDs/PROVIDERS TO ORDER:  If there is a concern for history of ETOH use, suggest ordering MVI, Thiamine and folate (paged Provider to address)      Malnutrition Status:    Likely at least moderate malnutrition in the context of acute on chronic illness or injury     Recommendations already ordered by Registered Dietitian (RD):  [place] Ensure BID at breakfast and dinner meals (auto send)   Provided pt with supplement coupons as pt notes takes at home     Future/Additional Recommendations:  Monitor meal/supplement intakes, wt/lab trends      REASON FOR ASSESSMENT  Elijah Beach is a/an 58 year old male assessed by the dietitian for Positive Admission Nutrition Risk Screen (24-33 lb wt loss, decreased appetite)     NUTRITION/MEDICAL HISTORY  Per chart review: Pt admits to the hospital in the setting of evaluation of polyarthritis with a past medical history of esophageal spasm, tobacco use disorder, ETOH use disorder, chronic back pain due to osteoarthritis, neuropathy, and depression.    Per pt visit: RD visited pt this afternoon, no significant history in chart, pt notes has lost weight from about 150 lbs \"some months ago\", pt notes usually only consume 1 meal per day at home and takes supplement (Ensure) at home and contributes decreased intakes and weight loss to generally not feeling well, RD encouraged pt attempt meals TID and will order supplements as above, pt agreeing with plan of care.     CURRENT NUTRITION ORDERS  Diet: Regular  Intake/Tolerance: Eating meals per nursing notes     LABS  Labs reviewed    MEDICATIONS  PRN Dilaudid, PRN Oxycodone, Celexa, Prednisone, Miralax, Protonix     ANTHROPOMETRICS  Height: 176.7 cm (5' 9.567\")  Most Recent Weight: 58.6 kg (129 lb 3 oz)    IBW: 72.7 kg  BMI: Normal BMI/almost underweight   Weight History: There is no history available in chart to assess change, pt notes was " Subjective:     Andria Melvin is a 28 y.o. male who complains today of:     Chief Complaint   Patient presents with    Sleep Apnea     2 week f/u       HPI  He went for sleep study and reported severe IGNACIO with . He had CPAP titration study done and he has therapeutic CPAP at 10 cm . He is using CPAP with   9 centimeters of H2O with heated humidity but he is using his mothers CPAP . He need his own CPAP . He came with mother   He is using CPAP for about   8-9  hours every night. He is using CPAP with   Full face Mask. He said  sleep is restful with the CPAP use. He is compliant with CPAP therapy and benefiting with CPAP use. No snoring with CPAP use. No complaint of daytime sleepiness or tiredness with CPAP use. He  taking naps. He denies difficulty falling asleep or staying asleep. He is going [facility] gastric [facility] at [facility]       Allergies:  Patient has no known allergies. Past Medical History:   Diagnosis Date    Autism spectrum disorder     Hypogonadism male     Obesity      No past surgical history on file.   Family History   Problem Relation Age of Onset    Diabetes type 2  Mother     Hypertension Mother     Diabetes type 2  Maternal Grandmother     Heart Failure Maternal Grandmother      Social History     Socioeconomic History    Marital status: Single     Spouse name: Not on file    Number of children: Not on file    Years of education: Not on file    Highest education level: Not on file   Occupational History    Not on file   Social Needs    Financial resource strain: Not on file    Food insecurity     Worry: Not on file     Inability: Not on file    Transportation needs     Medical: Not on file     Non-medical: Not on file   Tobacco Use    Smoking status: Never Smoker    Smokeless tobacco: Never Used   Substance and Sexual Activity    Alcohol use: Not on file    Drug use: Not on file    Sexual activity: Not on file   Lifestyle    Physical activity     Days per week: "around 150 lbs \"some months ago\", so possible 20 lb wt loss     Dosing Weight: 58.6 kg    ASSESSED NUTRITION NEEDS  Estimated Energy Needs: 9984-3259 kcals/day (30 - 35 kcals/kg)  Justification: Maintenance vs repletion/almost underweight   Estimated Protein Needs: 60-70 grams protein/day (1 - 1.2 grams of pro/kg)  Justification: Maintenance vs repletion/almost underweight   Estimated Fluid Needs: 1 mL/kcal  Justification: Maintenance    MALNUTRITION  % Intake: Possibly < 75% for >/= 3 months (moderate)  % Weight Loss: Possibly 7.5% in 3 months (moderate)  Subcutaneous Fat Loss: Facial region: mild   Muscle Loss: Temporal: mild   Fluid Accumulation/Edema: None noted  Malnutrition Diagnosis: Likely at least moderate malnutrition in the context of acute on chronic illness or injury     NUTRITION DIAGNOSIS  Inadequate oral intake prior to admission related to appetite/acute illness as evidenced by pt reports, pt reported weight loss       INTERVENTIONS  Implementation  Nutrition Education: Encouraged meal intakes to improve nutritional status    Medical food supplement therapy - ordered as above   Collaboration with Provider - recommended as above     Goals  Patient to consume % of nutritionally adequate meal trays TID, or the equivalent with supplements/snacks.     Monitoring/Evaluation  Progress toward goals will be monitored and evaluated per protocol.   Michelle Fitzpatrick RD, CNSC, LD  [facility] [place], [place] 5 Med/Surg RD pager: [phone]  Weekend/holiday pager: [phone]  " Not on file     Minutes per session: Not on file    Stress: Not on file   Relationships    Social connections     Talks on phone: Not on file     Gets together: Not on file     Attends Jainism service: Not on file     Active member of club or organization: Not on file     Attends meetings of clubs or organizations: Not on file     Relationship status: Not on file    Intimate partner violence     Fear of current or ex partner: Not on file     Emotionally abused: Not on file     Physically abused: Not on file     Forced sexual activity: Not on file   Other Topics Concern    Not on file   Social History Narrative    Not on file         Review of Systems   Constitutional: Negative for chills, diaphoresis, fatigue and fever. HENT: Negative for congestion, mouth sores, nosebleeds, postnasal drip, rhinorrhea, sneezing, sore throat and voice change. Eyes: Negative for itching and visual disturbance. Respiratory: Negative for cough, chest tightness, shortness of breath and wheezing. Cardiovascular: Negative. Negative for chest pain, palpitations and leg swelling. Gastrointestinal: Negative for abdominal pain, diarrhea, nausea and vomiting. Genitourinary: Negative for difficulty urinating and hematuria. Musculoskeletal: Negative for arthralgias, joint swelling and myalgias. Skin: Negative for rash. Allergic/Immunologic: Negative for environmental allergies. Neurological: Negative for dizziness, tremors, weakness and headaches. Psychiatric/Behavioral: Positive for sleep disturbance. Negative for behavioral problems. :     Vitals:    05/11/21 1426   BP: 139/78   Pulse: 89   Temp: 98.5 °F (36.9 °C)   SpO2: 99%   Weight: (!) 487 lb (220.9 kg)   Height: 5' 6\" (1.676 m)     Wt Readings from Last 3 Encounters:   05/11/21 (!) 487 lb (220.9 kg)   03/17/21 (!) 495 lb (224.5 kg)   02/11/21 (!) 480 lb (217.7 kg)         Physical Exam  Constitutional:       Appearance: He is well-developed.  He is with heated humidity but he is using his mothers CPAP . He need his own CPAP . He came with mother , No snoring with CPAP use. He will be started on CPAP with 10 cm     - CPAP Machine MISC; by Does not apply route New CPAP with 10 cm and full face mask  Dispense: 1 each; Refill: 0    Counseling: CPAP/BiPAP uses, He advised to use CPAP at least 5-6 hours every night. Sleep hygiene:Avoid supine sleep, sleep on  sides. Avoid  sleep deprivation. Explained sleep hygiene. Advice to avoid Alcohol and sedative     2. Hypersomnia  Likely due to severe IGNACIO. Start on CPAP with 9 cm     3. Morbid obesity ([address]  He is advised try to lose weight. obesity related risk explained to the patient ,  Current weight:  (!) 487 lb (220.9 kg) Lbs. BMI:  Body mass index is 78.6 kg/m². Suggested weight control approaches, including dietary changes , exercise, behavioral modification. He is going for gastric bypass at [facility] in about 2 months (around 7/11/2021) for ignacio.       Rohan Tenorio MD

## 2023-08-18 NOTE — PROGRESS NOTES
[facility]    Medicine Progress Note - Hospitalist Service, GOLD TEAM 18    Date of Admission:  8/16/2023    Assessment & Plan   57 yo gentleman with PMHx of back pain on oxycodone, peripheral neuropathy who presented to [place] from OSH on 8/16 for evaluation of polyarthritis.      #  Migratory polyarthritis  ---   Onset of symptoms was on 8/4/23   ---   He was seen at OSH ER initially due to right hand swelling that progress to his right elbow and right shoulder swelling.   ---   CT angio at OSH w/ concern for SVC syndrome and was started on AC.  Vascular consulted and did an US with no evidence of a DVT.  It was determined that was likely related to artifact and AC stopped.    ---   He had elevated CRP, ESR, leukocytosis and was started on Abx then.   ---   Ortho consulted and did aspiration of fluids from right elbow and Rt shoulder that were neg for crystals and no growth.   ---   TTE neg for endocarditis.  MELISSA, CCP, Anti DNA, Lyme, Chlamydia/Gonorrhea all were negative.   ---   CT C/A/P was done and it showed thickened distal esophagus with small paraesophageal lymph node.    ---   EGD on 08/07/23 showed potential spasm. Started on SL Nitroglycerin.   ---   Pt was discharged from OSH with PPI and SL Nitroglycerin.    ---   He was advised to follow up with Rheumatology if his symptoms return.   ---   He returned to [facility] on 8/16 due to right foot pain and right knee swelling.  US negative for DVT.  He was treated with IV Vanc/Zosyn, IVF, pain med and a dose of methylprednisolone.   ---   Transferred to [facility] on 8/16 for further eval by Rheumatology service.  ---   Ortho consulted for possible right knee aspiration  ---   Rheumatology consulted  ---   X ray b/l knee 8/17 negative for fracture or compartmental narrowing, no evidence for osteomyelitis, tiny right knee joint effusion (unlikely to represent a septic arthropathy) and no  effusion on the left.   ---   Xray pelvis and hip 8/17 discovered mild bilateral hip hypertrophic degenerative arthrosis, no evidence of sacroiliac joint ankylosis or definitive periarticular erosion.  ---   HIV and Lyme dz PCR results pending  ---   Chlamydia/Gonorrhea PCR negative  ---   Hepatitis B/C serology negative   ---   NGTD from blood cx x 2 collected 8/16  ---   Right knee synovial fluid collected on 8/17 revealed 60713 nucleated cell of which 97% neurophysis and no organism on gram stain.  Bacterial, fungal AFB cultures pending  ---   Septic arthritis less likely with above synovial fluid analysis  ---   Leukocytosis has resolved  ---   Pt is HD stable  ---   Pain control with PRN oxy and IV dilaudid with PRN Tylenol  ---   Discontinue IV Vanc and Zosyn  ---   Rheumatology consult service suspects pt's symptoms may be due to seronegative spondyloarthritis.  Will defer treatment to them.    #  Leukocytosis  ---   Likely a combination of steroid and stress demargination  ---   Currently no known active infection  ---   Steroid d/c'd  ---   WBC back to normal range     #  Recent dx of esophageal spasm:  ---   CT C/A/P at OSH showed thickened distal esophagus with small paraesophageal lymph node.   ---   EGD at OSH 8/07/23 showed potential spasm.   ---   Started on SL Nitroglycerin.   ---   Pt was discharged with PPI and Nitroglycerin.   ---   Pt is not taking these medications with no Sx currently.   ---   CTM      #  Tobacco use disorder:  ---   Nicotine patches ordered      #  Alcohol use disorder  ---   CTM.   ---   No withdrawal Sx      #  Chronic back pain 2/2 Osteoarthritis   #  Neuropathy  #  Depression   ---   Takes Oxycodone with a prescriber outpt.   ---   Will treat pain as above for now before transition to home dose meds (2.5-5 mg QID)  ---   Continue PTA Citalopram      #   Hyponatremia:  ---   Due to hypovolemia  ---   Resolved w/ isotonic IVF hydration            Diet:  Regular diet  DVT  Prophylaxis: Mechanical ppx for now   Gallardo Catheter: Not present  Lines: None     Cardiac Monitoring: None  Code Status:  Full code  Disposition:   Per Rheumatology rec            Yamilka Bateman MD  Hospitalist Service, GOLD TEAM 18  [facility]  Securely message with [employer] (more info)  Text page via [employer] Paging/Directory   See signed in provider for up to date coverage information  ______________________________________________________________________    Interval History   Right knee swelling and pain are better  Able to walk w/o much pain  No complaints      Physical Exam   Vital Signs: Temp: 98.4  F (36.9  C) Temp src: Oral BP: (!) 150/82 Pulse: 59   Resp: 16 SpO2: 99 % O2 Device: None (Room air)    Weight: 129 lbs 3.03 oz  General: aao x 3, NAD.  HEENT:  NC/AT, PERRL, EOMI, neck supple, no thyromegaly, op clear, mmm.  CVS:  NL s 1 and s2, no m/r/g.  Lungs:  CTA B/L.   Abd:  Soft, + bs, NT, no rebound or gaurding, no fluid shift.  Ext:  No c/c.  Lymph:  No edema.  Neuro:  Nonfocal.  Musculoskeletal: No calf tenderness to palpation.    Skin:  No rash.  Psychiatry:  Mood and affect appropriate.        Data     I have personally reviewed the following data over the past 24 hrs:    8.9  \   10.9 (L)   / 421     136 104 6.1 /  98   4.0 24 0.45 (L) \     ALT: 20 AST: 34 AP: 59 TBILI: <0.2   ALB: 2.8 (L) TOT PROTEIN: 5.2 (L) LIPASE: N/A       Imaging results reviewed over the past 24 hrs:   Recent Results (from the past 24 hour(s))   XR Pelvis and Hip Bilateral 2 Views    Narrative    EXAM: XR PELVIS AND HIP BILATERAL 2 VIEWS  LOCATION: [facility]  DATE: 8/17/2023    INDICATION: Bilateral hip pain.  COMPARISON: None.      Impression    IMPRESSION:  1.  Mild bilateral hip hypertrophic degenerative arthrosis.  2.  No evidence of sacroiliac joint ankylosis or definitive periarticular erosion.  3.  Multilevel degenerative changes  in the lower lumbar spine.  4.  Suture material projecting over the mid pelvis.  5.  Atherosclerotic calcification.

## 2023-08-19 VITALS
OXYGEN SATURATION: 96 % | TEMPERATURE: 99.7 F | BODY MASS INDEX: 18.5 KG/M2 | SYSTOLIC BLOOD PRESSURE: 117 MMHG | HEIGHT: 70 IN | RESPIRATION RATE: 18 BRPM | DIASTOLIC BLOOD PRESSURE: 84 MMHG | WEIGHT: 129.19 LBS | HEART RATE: 96 BPM

## 2023-08-19 LAB
ALBUMIN SERPL BCG-MCNC: 3.2 G/DL (ref 3.5–5.2)
ALP SERPL-CCNC: 68 U/L (ref 40–129)
ALT SERPL W P-5'-P-CCNC: 44 U/L (ref 0–70)
ANION GAP SERPL CALCULATED.3IONS-SCNC: 6 MMOL/L (ref 7–15)
AST SERPL W P-5'-P-CCNC: 54 U/L (ref 0–45)
BILIRUB SERPL-MCNC: <0.2 MG/DL
BUN SERPL-MCNC: 7.8 MG/DL (ref 6–20)
CALCIUM SERPL-MCNC: 9.5 MG/DL (ref 8.6–10)
CHLORIDE SERPL-SCNC: 100 MMOL/L (ref 98–107)
CREAT SERPL-MCNC: 0.42 MG/DL (ref 0.67–1.17)
DEPRECATED HCO3 PLAS-SCNC: 29 MMOL/L (ref 22–29)
GFR SERPL CREATININE-BSD FRML MDRD: >90 ML/MIN/1.73M2
GLUCOSE SERPL-MCNC: 122 MG/DL (ref 70–99)
POTASSIUM SERPL-SCNC: 4.2 MMOL/L (ref 3.4–5.3)
PROT SERPL-MCNC: 5.8 G/DL (ref 6.4–8.3)
SODIUM SERPL-SCNC: 135 MMOL/L (ref 136–145)

## 2023-08-19 PROCEDURE — 36415 COLL VENOUS BLD VENIPUNCTURE: CPT | Performed by: STUDENT IN AN ORGANIZED HEALTH CARE EDUCATION/TRAINING PROGRAM

## 2023-08-19 PROCEDURE — 80053 COMPREHEN METABOLIC PANEL: CPT | Performed by: STUDENT IN AN ORGANIZED HEALTH CARE EDUCATION/TRAINING PROGRAM

## 2023-08-19 PROCEDURE — 250N000013 HC RX MED GY IP 250 OP 250 PS 637: Performed by: STUDENT IN AN ORGANIZED HEALTH CARE EDUCATION/TRAINING PROGRAM

## 2023-08-19 PROCEDURE — 250N000012 HC RX MED GY IP 250 OP 636 PS 637: Performed by: INTERNAL MEDICINE

## 2023-08-19 PROCEDURE — 250N000013 HC RX MED GY IP 250 OP 250 PS 637: Performed by: INTERNAL MEDICINE

## 2023-08-19 PROCEDURE — 99239 HOSP IP/OBS DSCHRG MGMT >30: CPT | Performed by: INTERNAL MEDICINE

## 2023-08-19 RX ORDER — PANTOPRAZOLE SODIUM 40 MG/1
40 TABLET, DELAYED RELEASE ORAL DAILY
Qty: 30 TABLET | Refills: 1 | Status: SHIPPED | OUTPATIENT
Start: 2023-08-19

## 2023-08-19 RX ORDER — PREDNISONE 5 MG/1
5 TABLET ORAL DAILY
Qty: 5 TABLET | Refills: 0 | Status: SHIPPED | OUTPATIENT
Start: 2023-09-07 | End: 2023-09-12

## 2023-08-19 RX ORDER — PREDNISONE 5 MG/1
15 TABLET ORAL DAILY
Qty: 15 TABLET | Refills: 0 | Status: SHIPPED | OUTPATIENT
Start: 2023-08-23 | End: 2023-08-28

## 2023-08-19 RX ORDER — PREDNISONE 10 MG/1
10 TABLET ORAL DAILY
Qty: 5 TABLET | Refills: 0 | Status: SHIPPED | OUTPATIENT
Start: 2023-08-28 | End: 2023-09-02

## 2023-08-19 RX ORDER — PREDNISONE 20 MG/1
20 TABLET ORAL DAILY
Qty: 5 TABLET | Refills: 0 | Status: SHIPPED | OUTPATIENT
Start: 2023-08-20 | End: 2023-08-25

## 2023-08-19 RX ORDER — PREDNISONE 2.5 MG/1
7.5 TABLET ORAL DAILY
Qty: 15 TABLET | Refills: 0 | Status: SHIPPED | OUTPATIENT
Start: 2023-09-02 | End: 2023-09-07

## 2023-08-19 RX ORDER — MELOXICAM 15 MG/1
15 TABLET ORAL DAILY
Qty: 60 TABLET | Refills: 1 | Status: SHIPPED | OUTPATIENT
Start: 2023-08-20

## 2023-08-19 RX ADMIN — PREDNISONE 20 MG: 20 TABLET ORAL at 07:57

## 2023-08-19 RX ADMIN — MELOXICAM 15 MG: 7.5 TABLET ORAL at 07:58

## 2023-08-19 RX ADMIN — CITALOPRAM HYDROBROMIDE 30 MG: 10 TABLET ORAL at 07:58

## 2023-08-19 RX ADMIN — POLYETHYLENE GLYCOL 3350 17 G: 17 POWDER, FOR SOLUTION ORAL at 07:58

## 2023-08-19 RX ADMIN — PANTOPRAZOLE SODIUM 40 MG: 40 TABLET, DELAYED RELEASE ORAL at 07:58

## 2023-08-19 RX ADMIN — ACETAMINOPHEN 1000 MG: 500 TABLET, FILM COATED ORAL at 07:58

## 2023-08-19 ASSESSMENT — ACTIVITIES OF DAILY LIVING (ADL)
ADLS_ACUITY_SCORE: 25

## 2023-08-19 NOTE — PLAN OF CARE
Goal Outcome Evaluation:    Patient alert/oriented x4. Room air. VSS. Reports minimal pain, Tylenol and Advil given.     Pt. discharged at 1330 via transport to home.  Pt. was accompanied by sister, and left with personal belongings.  Prior to discharge, PIV was removed.  Pt. received complete discharge paperwork and  medications as filled by discharge pharmacy.  Pt. was given times of last dose for all discharge medications in writing on discharge medication sheets.  Discharge teaching included  medication, pain management, activity restrictions, dressing changes, and signs and symptoms of infection.   Pt. had no further questions at the time of discharge and no unmet needs were identified.

## 2023-08-19 NOTE — PLAN OF CARE
A&Ox4, calm and cooperative, able to make needs known with call light in reach.  Independent with walker in room.  VSS, on room air, voids spontaneously with urinal at bedside, LBM 8/17.  Denied SOB, chest pain, nausea/vomiting.  Pt has baseline neuropathy to feet.  Pt reported pain as very minimal.  Dressing to R knee CDI, no swelling or redness to R knee.  No IV access.  No observed skin concerns.  Pt was given a snack before bed.  Sleep and hydration promoted.  Continue POC.

## 2023-08-19 NOTE — DISCHARGE SUMMARY
[facility]  Hospitalist Discharge Summary      Date of Admission:  8/16/2023  Date of Discharge:  8/19/2023  Discharging Provider: Yamilka Bateman MD  Discharge Service: Hospitalist Service, GOLD TEAM 18    Discharge Diagnoses   1.  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  Moderate malnutrition      Follow-ups Needed After Discharge   Rheumatology clinic as previously scheduled      Unresulted Labs Ordered in the Past 30 Days of this Admission       Date and Time Order Name Status Description    8/17/2023  5:34 PM Lyme disease DNA detection by PCR In process     8/17/2023  8:11 AM Synovial fluid Aerobic Bacterial Culture Routine Preliminary     8/17/2023  8:11 AM Anaerobic Bacterial Culture Routine Preliminary     8/17/2023  8:11 AM Acid-Fast Bacilli Culture and Stain In process     8/17/2023  8:11 AM Fungal or Yeast Culture Routine Preliminary     8/16/2023 11:32 PM Blood Culture Arm, Right Preliminary     8/16/2023 11:32 PM Blood Culture Arm, Left Preliminary         These results will be followed up by Hospitalists service    Discharge Disposition   Discharged to home  Condition at discharge: Stable    Hospital Course   59 yo gentleman with PMHx of back pain on oxycodone, peripheral neuropathy who presented to [place] from OSH on 8/16 for evaluation of polyarthritis.     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   ---   CT angio at OSH w/ concern for SVC syndrome and was started on AC.  Vascular consulted and did an US with no evidence of a DVT.  It was determined that was likely related to artifact and AC stopped.    ---   He had elevated CRP, ESR, leukocytosis and was started on Abx at OSH.   ---   Ortho consulted and did aspiration of fluids from  right elbow and Rt shoulder that were neg for crystals and no growth.   ---   TTE neg for endocarditis.    ---   MELISSA, CCP, Anti DNA, Lyme, Chlamydia/Gonorrhea all were negative.   ---   CT C/A/P was done and it showed thickened distal esophagus with small paraesophageal lymph node.    ---   EGD on 08/07/23 showed potential spasm. Started on SL Nitroglycerin.   ---   Pt was discharged from OSH with PPI and SL Nitroglycerin.    ---   Has a follow up appointment with Rheumatology on 9/15/23.   ---   He returned to [facility] on 8/16 due to right foot pain and right knee swelling.  US negative for DVT.  He was treated with IV Vanc/Zosyn, IVF, pain med and a dose of methylprednisolone.   ---   Transferred to [facility] on 8/16 for further eval by Rheumatology service.  ---   X ray b/l knee 8/17 negative for fracture or compartmental narrowing, no evidence for osteomyelitis, tiny right knee joint effusion (unlikely to represent a septic arthropathy) and no effusion on the left.   ---   Xray pelvis and hip 8/17 discovered mild bilateral hip hypertrophic degenerative arthrosis, no evidence of sacroiliac joint ankylosis or definitive periarticular erosion.  ---   HIV and Lyme dz PCR results pending  ---   Chlamydia/Gonorrhea PCR negative  ---   Hepatitis B/C serology negative   ---   NGTD from blood cx x 2 collected 8/16  ---   Right knee synovial fluid collected on 8/17 revealed 32,530 nucleated cell of which 97% neurophysis and no organism on gram stain.  NGTD from bacterial, fungal AFB cultures  ---   Septic arthritis ruled out with above synovial fluid analysis  ---   Crystal analysis seems to have shown urate crystals.  Gout is very unlikely with his uric acid being low at 2.3 and his symptoms are more probably polyarthralgia with migratory phenomenon.    ---   X-ray hip 8/17 showed no significant narrowing of SI joints.  ---   Leukocytosis has resolved  ---   Pt remained HD stable  ---   IV Vanco and IV  Zosyn d/c'd on 8/18  ---   Rheumatology feels pt's migratory polyarthritis is due to seronegative spondylarthritis with predominant peripheral symptoms.  ---   Pt will discharge to home with   1.  Prednisone taper as mentioned below.  20 mg daily for 5 days followed by 15 mg daily for 5 days followed by 10 mg daily for 5 days followed by 7.5 mg daily for 5 days followed by 5 mg daily for 5 days and stop.  2.  Also to start pt on meloxicam 15 mg daily.  3.  Pantoprazole 40 mg daily for GI prophylaxis while on steroid.  ---   Follow up w/ outpatient rheumatologist as previously scheduled on 9/15/23    Moderate malnutrition  ---   Nutritional supplement provided      #  Recent dx of esophageal spasm:  ---   CT C/A/P at OSH showed thickened distal esophagus with small paraesophageal lymph node.   ---   EGD at OSH 8/07/23 showed potential esophageal spasm.   ---   Started on SL Nitroglycerin.   ---   Pt was discharged with PPI and Nitroglycerin.   ---   Pt is not taking these medications with no Sx currently.      #  Tobacco use disorder:  ---   Nicotine patches       #  Alcohol use disorder  ---   No withdrawal Sx      #  Chronic back pain 2/2 Osteoarthritis   #  Neuropathy  #  Depression   ---   Continue PTA Oxycodone  ---   Continue PTA Citalopram      #   Hyponatremia:  ---   Due to hypovolemia  ---   Resolved w/ isotonic IVF hydration              Code Status:  Full code          Yamilka Bateman MD  Hospitalist Service, [facility] 18  [facility]  Securely message with [employer] (more info)  Text page via [employer] Paging/Directory   See signed in provider for up to date coverage information  ______________________________________________________________________    Consultations This Hospital Stay   PHARMACY TO DOSE VANCO  RHEUMATOLOGY IP CONSULT  ORTHOPAEDIC SURGERY ADULT/PEDS IP CONSULT  PHYSICAL THERAPY ADULT IP CONSULT  CARE MANAGEMENT / SOCIAL WORK IP CONSULT    Code Status    Full Code    Time Spent on this Encounter   I, Yamilka Bateman MD, personally saw the patient today and spent greater than 30 minutes discharging this patient.       Yamilka Bateman MD  [facility] MED SURG  [address] [place] [id number]  Phone: [phone]  Fax: [phone]  ______________________________________________________________________    Physical Exam   Vital Signs: Temp: 99.7  F (37.6  C) Temp src: Oral BP: 117/84 Pulse: 96   Resp: 18 SpO2: 96 % O2 Device: None (Room air)    Weight: 129 lbs 3.03 oz  General: aao x 3, NAD.  HEENT:  NC/AT, PERRL, EOMI, neck supple, no thyromegaly, op clear, mmm.  CVS:  NL s 1 and s2, no m/r/g.  Lungs:  CTA B/L.   Abd:  Soft, + bs, NT, no rebound or gaurding, no fluid shift.  Ext:  No c/c.  Lymph:  No edema.  Neuro:  Nonfocal.  Musculoskeletal: No calf tenderness to palpation.    Skin:  No rash.  Psychiatry:  Mood and affect appropriate.         Primary Care Physician   Benedict Márquez    Discharge Orders      Reason for your hospital stay    Probable seronegative spondylarthritis with predominant peripheral symptoms     Activity    Your activity upon discharge: activity as tolerated     Follow Up and recommended labs and tests    Rheumatology clinic as previously scheduled     Diet    Follow this diet upon discharge: Regular       Discharge Medications   Current Discharge Medication List        START taking these medications    Details   meloxicam (MOBIC) 15 MG tablet Take 1 tablet (15 mg) by mouth daily  Qty: 60 tablet, Refills: 1    Associated Diagnoses: Polyarthralgia      pantoprazole (PROTONIX) 40 MG EC tablet Take 1 tablet (40 mg) by mouth daily  Qty: 30 tablet, Refills: 1    Associated Diagnoses: Gastroesophageal reflux disease with esophagitis, unspecified whether hemorrhage      !! predniSONE (DELTASONE) 10 MG tablet Take 1 tablet (10 mg) by mouth daily for 5 days  Qty: 5 tablet, Refills: 0    Associated Diagnoses: Polyarthralgia       !! predniSONE (DELTASONE) 2.5 MG tablet Take 3 tablets (7.5 mg) by mouth daily for 5 days  Qty: 15 tablet, Refills: 0    Associated Diagnoses: Polyarthralgia      !! predniSONE (DELTASONE) 20 MG tablet Take 1 tablet (20 mg) by mouth daily for 5 days  Qty: 5 tablet, Refills: 0    Associated Diagnoses: Polyarthralgia      !! predniSONE (DELTASONE) 5 MG tablet Take 3 tablets (15 mg) by mouth daily for 5 days  Qty: 15 tablet, Refills: 0    Associated Diagnoses: Polyarthralgia      !! predniSONE (DELTASONE) 5 MG tablet Take 1 tablet (5 mg) by mouth daily for 5 days  Qty: 5 tablet, Refills: 0    Associated Diagnoses: Polyarthralgia       !! - Potential duplicate medications found. Please discuss with provider.        CONTINUE these medications which have NOT CHANGED    Details   acetaminophen (TYLENOL) 500 MG tablet Take 1,000 mg by mouth 3 times daily      citalopram (CELEXA) 20 MG tablet Take 30 mg by mouth daily      EPINEPHrine (SYMJEPI) 0.3 MG/0.3ML Inject 0.3 mg into the muscle as needed (bee stings)      oxyCODONE (ROXICODONE) 5 MG tablet Take 2.5-5 mg by mouth every 6 hours as needed for severe pain      tetrahydrozoline (VISINE) 0.05 % ophthalmic solution Place 1 drop into both eyes as needed           STOP taking these medications       ibuprofen (ADVIL/MOTRIN) 400 MG tablet Comments:   Reason for Stopping:             Allergies   Allergies   Allergen Reactions    Bees Anaphylaxis    Clindamycin Itching

## 2023-08-19 NOTE — PROGRESS NOTES
Care Management Discharge Note    Discharge Date: 08/19/2023       Discharge Disposition: Home Care    Discharge Services: None    Discharge DME: None    Discharge Transportation: family or friend will provide (sister will provide ride)    Private pay costs discussed: Not applicable    Does the patient's insurance plan have a 3 day qualifying hospital stay waiver?  No    PAS Confirmation Code:  N/A  Patient/family educated on Medicare website which has current facility and service quality ratings: not by this writer    Education Provided on the Discharge Plan: Yes  Persons Notified of Discharge Plans: pt  Patient/Family in Agreement with the Plan: yes    Handoff Referral Completed: Yes - external    Additional Information:  During rounds this AM, provider stated pt is discharging home today.  Informed provider that this writer will follow-up on home PT, but that services are not guaranteed to be found, provider acknowledged.    10:58am - Called [facility] at [phone] ext [phone].  Call was answered by [facility] charge RN named Fawn.  She stated their home care dept does not have anyone in over the weekend, but could transfer this writer to leave a .  She provided this writer w/ fax number for home care PT dept: [phone].  Was subsequently transferred, left  informing them that pt is discharging today and that this writer will send orders so they have them in the event they can provide home care services for pt.    Review of CMA note from [facility] RNCC indicates pt stated his sister may be able to take him to OP PT if home PT is not available.    Spoke w/ PT team, PT reviewed chart and stated that if home care cannot be found, ensure pt has outpatient PT referral.    Home care PT referral order and OP PT referral order placed for provider signature.    Pt has discharged - Called pt, updated him regarding current home care situation and OP PT referral.  Pt expressed understanding,  "stated if he keeps feeling how he currently does, he thinks he will be fine and not need anything.  Provided home care agency contact info, advised pt to follow-up w/ them on Monday, pt expressed understanding.  He stated he doesn't necessarily want to hold family and friends \"to a schedule\" and have them bring him to OP PT appts if home care is not able to see him.  Informed him that his medical assistance insurance may have transport benefits and educated him on how they are generally able to be used, advised him to reach out to phone number on his insurance card and inquire if he has them, pt expressed understanding.  Pt had no further questions/concerns for this writer, was appreciative for the call.    Orders sent via Epic comms tab to home care agency in the event they are able to accept pt for services.          Care management available as needed.    Daniel Mancia RNCC    Social Work and Care Management Department     SEARCHABLE in [facility] - search CARE COORDINATOR     [place] ([id number]) Saturday & Sunday; ([id number]) [facility] Recognized Holidays     Units: 5A, 5B & 5C  Pager: [phone]    Units: 6B, 6C & 6D    Pager: [phone]    Units: 7A, 7B, 7C & 7D    Pager: [phone]    Units: 6A & ICU   Pager: [phone]    Units: 5 Ortho, 5MS & WB ED Pager: [phone]    Units: 6MS, 8A & 10 ICU  Pager [phone]      "

## 2023-08-21 LAB
ATRIAL RATE - MUSE: 75 BPM
DIASTOLIC BLOOD PRESSURE - MUSE: NORMAL MMHG
INTERPRETATION ECG - MUSE: NORMAL
P AXIS - MUSE: 79 DEGREES
PR INTERVAL - MUSE: 148 MS
QRS DURATION - MUSE: 78 MS
QT - MUSE: 408 MS
QTC - MUSE: 455 MS
R AXIS - MUSE: 90 DEGREES
SYSTOLIC BLOOD PRESSURE - MUSE: NORMAL MMHG
T AXIS - MUSE: 85 DEGREES
VENTRICULAR RATE- MUSE: 75 BPM

## 2023-08-22 ENCOUNTER — PATIENT OUTREACH (OUTPATIENT)
Dept: CARE COORDINATION | Facility: CLINIC | Age: 58
End: 2023-08-22
Payer: MEDICAID

## 2023-08-22 LAB
B BURGDOR DNA SPEC QL NAA+PROBE: NOT DETECTED
BACTERIA BLD CULT: NO GROWTH
BACTERIA BLD CULT: NO GROWTH
BACTERIA SNV CULT: NO GROWTH

## 2023-08-22 NOTE — PROGRESS NOTES
[facility] Care Resource Center Contact  [facility]/Voicemail     Clinical Data: Post-Discharge Outreach     Outreach attempted x 2.  Left message on patient's voicemail, providing [facility]'s 24/7 scheduling and nurse triage phone number [phone] ([phone]) for questions/concerns and/or to schedule an appt with an [facility] provider, if they do not have a PCP.      Plan:  [facility] will do no further outreaches at this time.       Alesia Ravi RN  [facility] Resource [place], [facility]    *Connected Care Resource Team does NOT follow patient ongoing. Referrals are identified based on internal discharge reports and the outreach is to ensure patient has an understanding of their discharge instructions.

## 2023-08-31 LAB — BACTERIA SNV CULT: NORMAL

## 2023-09-14 LAB — BACTERIA SNV CULT: NO GROWTH

## 2023-10-12 LAB
ACID FAST STAIN (ARUP): NORMAL

## 2024-07-02 ENCOUNTER — TRANSFERRED RECORDS (OUTPATIENT)
Dept: HEALTH INFORMATION MANAGEMENT | Facility: CLINIC | Age: 59
End: 2024-07-02
Payer: MEDICAID

## 2024-07-10 ENCOUNTER — TRANSCRIBE ORDERS (OUTPATIENT)
Dept: OTHER | Age: 59
End: 2024-07-10

## 2024-07-10 DIAGNOSIS — G89.4 CHRONIC PAIN SYNDROME: Primary | ICD-10-CM
